# Patient Record
Sex: FEMALE | Race: WHITE | NOT HISPANIC OR LATINO | Employment: UNEMPLOYED | URBAN - METROPOLITAN AREA
[De-identification: names, ages, dates, MRNs, and addresses within clinical notes are randomized per-mention and may not be internally consistent; named-entity substitution may affect disease eponyms.]

---

## 2017-12-06 ENCOUNTER — CONVERSION ENCOUNTER (OUTPATIENT)
Dept: MAMMOGRAPHY | Facility: HOSPITAL | Age: 52
End: 2017-12-06

## 2018-02-05 ENCOUNTER — OFFICE VISIT CONVERTED (OUTPATIENT)
Dept: UROLOGY | Facility: CLINIC | Age: 53
End: 2018-02-05
Attending: UROLOGY

## 2018-02-05 ENCOUNTER — CONVERSION ENCOUNTER (OUTPATIENT)
Dept: SURGERY | Facility: CLINIC | Age: 53
End: 2018-02-05

## 2019-07-03 ENCOUNTER — OFFICE VISIT CONVERTED (OUTPATIENT)
Dept: UROLOGY | Facility: CLINIC | Age: 54
End: 2019-07-03
Attending: UROLOGY

## 2019-07-30 ENCOUNTER — HOSPITAL ENCOUNTER (OUTPATIENT)
Dept: ULTRASOUND IMAGING | Facility: HOSPITAL | Age: 54
Discharge: HOME | End: 2019-07-30
Attending: UROLOGY

## 2019-08-13 ENCOUNTER — OFFICE VISIT CONVERTED (OUTPATIENT)
Dept: UROLOGY | Facility: CLINIC | Age: 54
End: 2019-08-13
Attending: UROLOGY

## 2020-11-04 ENCOUNTER — OFFICE VISIT CONVERTED (OUTPATIENT)
Dept: SURGERY | Facility: CLINIC | Age: 55
End: 2020-11-04
Attending: SURGERY

## 2020-11-11 ENCOUNTER — HOSPITAL ENCOUNTER (OUTPATIENT)
Dept: CT IMAGING | Facility: HOSPITAL | Age: 55
Discharge: HOME | End: 2020-11-11
Attending: SURGERY

## 2021-04-20 ENCOUNTER — OFFICE VISIT CONVERTED (OUTPATIENT)
Dept: UROLOGY | Facility: CLINIC | Age: 56
End: 2021-04-20
Attending: UROLOGY

## 2021-04-20 ENCOUNTER — CONVERSION ENCOUNTER (OUTPATIENT)
Dept: SURGERY | Facility: CLINIC | Age: 56
End: 2021-04-20

## 2021-04-26 ENCOUNTER — OFFICE VISIT CONVERTED (OUTPATIENT)
Dept: SURGERY | Facility: CLINIC | Age: 56
End: 2021-04-26
Attending: SURGERY

## 2021-04-30 ENCOUNTER — HOSPITAL ENCOUNTER (OUTPATIENT)
Dept: ULTRASOUND IMAGING | Facility: HOSPITAL | Age: 56
Discharge: HOME | End: 2021-04-30
Attending: SURGERY

## 2021-05-10 NOTE — H&P
History and Physical      Patient Name: Tia Silva   Patient ID: [id number]   Sex: Female   YOB: 1965    Primary Care Provider: Donovan Harrington MD   Referring Provider: Donovan Harrington MD    Visit Date: November 4, 2020    Provider: Stacy Sanchez MD   Location: [facility]   Location Address: [address] [place] [address]  [id number]   Location Phone: [phone]          Chief Complaint  · Abdominal Pain  · Breast Mass/Lump/Nodule  · I might have a hernia?       History Of Present Illness     Very pleasant lady here with 2 separate issues.... the first is a 2 cm cyst in the right outer breast that she does not want to have drained at this time... the second is a possible hernia recurrence versus seroma in right upper quadrant that was painful and a bulge at old incision.       Past Medical History  Abscess; Back Pain ; Cholecystitis, Chronic; Cholelithiasis; Cystitis; Hemiplegia; Left-sided Nephrolithiasis; Right-sided Nephrolithiasis; Seizures         Past Surgical History  Bladder Biopsy; Bladder Surg.; Cholecystectomy; Cystoscopy and ureteroscopy with laser lithotripsy; Hysterectomy; Incision and drainage of left breast abscess; Kidney Stone Surgery, Unspecified; Lumbar epidural steroid injections; Tubal ligation         Medication List  baclofen 10 mg oral tablet; Dulera 100-5 mcg/actuation inhalation HFA aerosol inhaler; gabapentin 300 mg oral capsule; levothyroxine 50 mcg oral tablet; oxycodone oral; potassium 99 mg oral tablet; Ventolin HFA 90 mcg/actuation inhalation HFA aerosol inhaler         Allergy List  BuSpar; Wellbutrin       Allergies Reconciled  Family Medical History  Kidney Cancer         Social History  Tobacco (Current every day)         Review of Systems  · Constitutional  o Denies  o : fever, chills  · Eyes  o Denies  o : yellowish discoloration of the eyes, eye pain  · HENT  o Denies  o : difficulty swallowing,  "hoarseness  · Cardiovascular  o Denies  o : chest pain on exertion, irregular heart beats  · Respiratory  o Denies  o : shortness of breath, cough  · Gastrointestinal  o * See HPI  · Integument  o Denies  o : rash, Skin Lesion or Lump  · Neurologic  o Denies  o : tingling or numbness, loss of balance  · Musculoskeletal  o Denies  o : joint pain, back pain  · Endocrine  o Denies  o : weight gain, weight loss      Vitals  Date Time BP Position Site L\R Cuff Size HR RR TEMP (F) WT  HT  BMI kg/m2 BSA m2 O2 Sat FR L/min FiO2 HC       11/04/2020 09:39 AM       15  210lbs 0oz 5'  7\" 32.89 2.12             Physical Examination  · Constitutional  o Appearance  o : well developed/well nourished patient in no apparent distress  · Respiratory  o Inspection of Chest  o : equal breaths bilaterally  · Gastrointestinal  o Abdominal Examination  o : soft/nontender, nondistended, no organomegaly appreciated, bulge in ruq           Assessment  · Abdominal Pain, RUQ     789.01/R10.11  · Breast cyst     610.0/N60.09      Plan  · Orders  o CT Abdomen and Pelvis without IV Contrast [facility]; suggest oral prep (allergic--drink Readicat; not allegic but with renal failure--drink Omnipaque) (82960) - 789.01/R10.11, 610.0/N60.09 - 11/11/2020  · Medications  o Medications have been Reconciled  o Transition of Care or Provider Policy  · Instructions  o Follow Up in 1 week after ct for resutls            Electronically Signed by: Stacy Sanchez MD -Author on November 4, 2020 09:57:10 AM  "

## 2021-05-11 NOTE — H&P
History and Physical      Patient Name: Tia Silva   Patient ID: [id number]   Sex: Female   YOB: 1965    Primary Care Provider: Donovan Harrington MD   Referring Provider: Donovan Harrington MD    Visit Date: April 26, 2021    Provider: Stacy Sanchez MD   Location: [facility]   Location Address: [address] [place] [address]  [id number]   Location Phone: [phone]          Chief Complaint  · I might have a hernia?      Seroma       History Of Present Illness     Very pleasant lady who was seen last year for similar complaint... she has a chronic seroma from a RUQ hernia repair after a lap jos by a Dr Oh at [facility].   It has continued to bother her and she would like to have it drained.       Past Medical History  Abscess; Back Pain ; Cholecystitis, Chronic; Cholelithiasis; Cystitis; Hemiplegia; Left-sided Nephrolithiasis; Right-sided Nephrolithiasis; Seizures         Past Surgical History  Bladder Biopsy; Bladder Surg.; Cholecystectomy; Cystoscopy and ureteroscopy with laser lithotripsy; Hysterectomy; Incision and drainage of left breast abscess; Kidney Stone Surgery, Unspecified; Lumbar epidural steroid injections; Tubal ligation         Medication List  baclofen 10 mg oral tablet; Dulera 100-5 mcg/actuation inhalation HFA aerosol inhaler; gabapentin 300 mg oral capsule; levothyroxine 50 mcg oral tablet; oxycodone oral; potassium 99 mg oral tablet; Ventolin HFA 90 mcg/actuation inhalation HFA aerosol inhaler         Allergy List  BuSpar; Wellbutrin       Allergies Reconciled  Family Medical History  Kidney Cancer         Social History  Tobacco (Current every day)         Review of Systems  · Constitutional  o Denies  o : fever, chills  · Eyes  o Denies  o : yellowish discoloration of the eyes, eye pain  · HENT  o Denies  o : difficulty swallowing, hoarseness  · Cardiovascular  o Denies  o : chest pain on exertion, irregular heart  "beats  · Respiratory  o Denies  o : shortness of breath, cough  · Gastrointestinal  o Denies  o : nausea, vomiting, diarrhea, constipation  · Integument  o * See HPI  · Neurologic  o Denies  o : tingling or numbness, loss of balance  · Musculoskeletal  o Denies  o : joint pain, back pain  · Endocrine  o Denies  o : weight gain, weight loss      Vitals  Date Time BP Position Site L\R Cuff Size HR RR TEMP (F) WT  HT  BMI kg/m2 BSA m2 O2 Sat FR L/min FiO2        04/26/2021 11:39 AM       16  218lbs 0oz 5'  7\" 34.14 2.16             Physical Examination  · Constitutional  o Appearance  o : well developed/well nourished patient in no apparent distress  · Respiratory  o Inspection of Chest  o : equal breaths bilaterally  · Gastrointestinal  o Abdominal Examination  o : soft/nontender, nondistended, no organomegaly appreciated, palpable seroma at hernia site  · Post Surgical Incision  o Surgical wound  o : healing well, no erythema          Assessment  · Abdominal wall seroma     998.13/S30.1XXA      Plan  · Orders  o CT guided complicated percut placement of drainage catheter of skin (85386) - 998.13/S30.1XXA - 04/26/2021  o CT guided aspiration of abdomen (42329) - 998.13/S30.1XXA - 04/26/2021  · Medications  o Medications have been Reconciled  o Transition of Care or Provider Policy  · Instructions  o Will send for ct guided aspiration of abdominal wall seroma            Electronically Signed by: Stacy Sanchez MD -Author on April 26, 2021 11:56:24 AM  "

## 2021-05-14 VITALS — BODY MASS INDEX: 32.96 KG/M2 | WEIGHT: 210 LBS | HEIGHT: 67 IN | RESPIRATION RATE: 15 BRPM

## 2021-05-14 VITALS — WEIGHT: 218.5 LBS | HEIGHT: 67 IN | BODY MASS INDEX: 34.29 KG/M2 | RESPIRATION RATE: 16 BRPM

## 2021-05-14 VITALS — BODY MASS INDEX: 34.21 KG/M2 | HEIGHT: 67 IN | WEIGHT: 218 LBS | RESPIRATION RATE: 16 BRPM

## 2021-05-14 NOTE — PROGRESS NOTES
"   Progress Note      Patient Name: Tia Silva   Patient ID: [id number]   Sex: Female   YOB: 1965    Primary Care Provider: Donovan Harrington MD   Referring Provider: Donovan Harrington MD    Visit Date: April 20, 2021    Provider: Corie Pretty MD   Location: [facility]   Location Address: [address] [place] [address]  [id number]   Location Phone: [phone]          Chief Complaint  · Pt is here for urological concerns      History Of Present Illness     55 yo lady with history of nephrolithiasis last seen by Dr. Fam, 2/2018, presents for further evaluation of ureteral stone after presentation to outside ER for worsening severe, acute, stabbing right sided flank pain which radiated to abdomen for a couple of days prior to presentation.  Denies alleviating or exacerbating factors.  Denies associated nausea; denies any fever. CT performed in the ER, 6/22/2019, indicated a right proximal ureteral calculi about 5mm with associated hydronephrosis as well as multiple stones within the right and left kidney largest measuring up to 6 mm; no hydronephrosis of the left kidney.  Her symptoms were controlled and she was discharged home for continued trial of passage.     Since discharge from ER, she states she has passed two small stones and patient denies any further significant pain episodes, denies recent fever or nausea.  Occasional \"twinge\" of right-sided flank pain. Not currently requiring medications for stone symptoms.    Update 8/13/2019: Patient presents for follow-up with renal ultrasound prior.  Patient denies flank pain, hematuria, or dysuria.  States she feels well today from a stone standpoint.    Update 4/20/2021: Patient presents with KUB prior.  Patient states she has passed stone since last visit; no trips to ER or significant stone symptoms.  Overall doing well from a stone standpoint.       Past Medical History  Abscess; Back Pain ; Cholecystitis, Chronic; " "Cholelithiasis; Cystitis; Hemiplegia; Left-sided Nephrolithiasis; Right-sided Nephrolithiasis; Seizures         Past Surgical History  Bladder Biopsy; Bladder Surg.; Cholecystectomy; Cystoscopy and ureteroscopy with laser lithotripsy; Hysterectomy; Incision and drainage of left breast abscess; Kidney Stone Surgery, Unspecified; Lumbar epidural steroid injections; Tubal ligation         Medication List  baclofen 10 mg oral tablet; Dulera 100-5 mcg/actuation inhalation HFA aerosol inhaler; gabapentin 300 mg oral capsule; levothyroxine 50 mcg oral tablet; oxycodone oral; potassium 99 mg oral tablet; Ventolin HFA 90 mcg/actuation inhalation HFA aerosol inhaler         Allergy List  BuSpar; Wellbutrin       Allergies Reconciled  Family Medical History  Kidney Cancer         Social History  Tobacco (Current every day)         Review of Systems  · Constitutional  o Denies  o : fever, chills  · Gastrointestinal  o Denies  o : nausea, vomiting      Vitals  Date Time BP Position Site L\R Cuff Size HR RR TEMP (F) WT  HT  BMI kg/m2 BSA m2 O2 Sat FR L/min FiO2        04/20/2021 02:00 PM       16  218lbs 8oz 5'  7\" 34.22 2.16             Physical Examination  · Constitutional  o Appearance  o : Well nourished, well developed patient in no acute distress.  · Eyes  o Conjunctivae  o : Conjunctivae normal  o Sclerae  o : Sclerae white  · Ears, Nose, Mouth and Throat  o Ears  o :   § Hearing  § : Intact to conversational voice both ears  § Ears  § : Normal  o Nose  o :   § External Nose  § : Appearance normal  · Skin and Subcutaneous Tissue  o General Inspection  o : No rashes, lesions, or areas of discoloration present  o General Palpation  o : Skin Turgor Normal  · Neurologic  o Mental Status Examination  o :   § Orientation  § : Alert and Oriented X3  o Gait and Station  o :   § Gait Screening  § : Ambulating without difficulty  · Psychiatric  o Mood and Affect  o : Mood normal, affect appropriate          Results       Confucianism " [facility]      PACS RADIOLOGY REPORT    Patient: MAUREEN JEFFRIES Acct: #[id number] Report: #[id number]    UNIT #: [id number]  DOS: 20 0942  INSURANCE:[employer] ORDER #:[id number]  LOCATION:CT   : 1965    PROVIDERS  ADMITTING:   ATTENDING: EVERETT SANFORD MD  FAMILY:  FLORESITA CHAPMAN ORDERING:  EVERETT SANFORD MD   OTHER:  DICTATING:  Gustavo Blackburn MD    REQ #:[id number]   EXAM:ABDPELWO - CT ABDOMEN PELVIS wo CONTRAST  REASON FOR EXAM:  RUQ PAIN/BULDGE  REASON FOR VISIT:  RUQ PAIN/BULDGE    *******Signed******     PROCEDURE: CT ABDOMEN PELVIS WITHOUT CONTRAST     COMPARISON: [facility], CT, ABDOMEN/PELVIS WITH CONTRAST, 2013, 22:54.  Other,   CT, ABD PEL W/O CONTRAST, 2019, 4:56.     INDICATIONS: RUQ PAIN/BULDGE     TECHNIQUE: CT images were created without intravenous contrast.       PROTOCOL:   Standard imaging protocol performed      RADIATION:   DLP: 967mGy*cm    Automated exposure control was utilized to minimize radiation dose.      FINDINGS:   No consolidations or pleural effusions are seen involving the lung bases.      The liver, spleen, and pancreas are unremarkable without contrast.  The patient is status post   cholecystectomy.  There are residual postoperative changes involving the anterior abdominal wall   overlying the right upper quadrant related to post cholecystectomy changes.  There is focal fluid   within this region which suggests a chronic seroma.  These findings are stable when compared to the   prior CT from 2019.  The bilateral adrenal glands are symmetric and unremarkable.      Multiple nonobstructive stones are observed involving the kidneys bilaterally measuring up to 6 mm   at the lower pole of the right kidney.  There is no hydronephrosis or hydroureter. There is no   evidence for obstructive uropathy. No stones are seen within the bladder.     No significant bowel dilatation or  obstruction is seen. A normal-appearing air-filled appendix is   observed. There is no evidence for acute appendicitis. No additional abnormal fluid collections are   seen. No significant free fluid is observed. No significant lymphadenopathy is seen throughout the   abdomen or pelvis. The bladder is partially decompressed.      No acute osseous abnormalities are seen.     CONCLUSION:   1. No evidence for acute abnormality throughout the abdomen or pelvis on this noncontrast study.  2. Nonobstructive stones are noted involving the kidneys bilaterally measuring up to 6 mm at the   lower pole of the right kidney. There is no evidence for obstructive uropathy.  3. Stable chronic postoperative changes with scarring and postoperative complex seroma are noted   involving the anterior abdominal wall overlying the right upper quadrant.            YULISA HUERTA MD         Electronically Signed and Approved By: YULISA HUERTA MD on 11/11/2020 at 11:07                     Until signed, this is an unconfirmed preliminary report that may contain  errors and is subject to change.                HAZDA:  D:11/11/20 1107         Assessment  · Nephrolithiasis     592.0/N20.0    Problems Reconciled  Plan  · Orders  o KUB xray [facility] (08504) - 592.0/N20.0 - 04/20/2022  · Medications  o Medications have been Reconciled  o Transition of Care or Provider Policy  · Instructions  o Electronically Identified Patient Education Materials Provided Electronically     KUB and CT scan reviewed and discussed with patient at length, no hydronephrosis, or ureteral calculi, nonobstructing bilateral nephrolithiasis up to 6 mm; patient adapt to passing stones without intervention; no stone of a size that warrants intervention  Continue to monitor.   Continue dietary modifications for stone prevention  All questions addressed    Patient to follow-up in 1 year with KUB prior      MDM low: 1 stable chronic illness; reviewed KUB and CT scan  imaging; ordered KUB             Electronically Signed by: Corie Pretty MD -Author on April 20, 2021 03:07:18 PM

## 2021-05-15 VITALS — HEIGHT: 68 IN | BODY MASS INDEX: 31.52 KG/M2 | WEIGHT: 208 LBS | RESPIRATION RATE: 14 BRPM

## 2021-05-15 VITALS — RESPIRATION RATE: 16 BRPM | HEIGHT: 68 IN | WEIGHT: 208 LBS | BODY MASS INDEX: 31.52 KG/M2

## 2021-05-16 VITALS
WEIGHT: 204 LBS | HEIGHT: 68 IN | DIASTOLIC BLOOD PRESSURE: 70 MMHG | BODY MASS INDEX: 30.92 KG/M2 | SYSTOLIC BLOOD PRESSURE: 108 MMHG

## 2021-06-16 ENCOUNTER — OFFICE VISIT (OUTPATIENT)
Dept: SURGERY | Facility: CLINIC | Age: 56
End: 2021-06-16

## 2021-06-16 VITALS — RESPIRATION RATE: 16 BRPM | BODY MASS INDEX: 33.74 KG/M2 | WEIGHT: 215 LBS | HEIGHT: 67 IN

## 2021-06-16 DIAGNOSIS — Z98.890 STATUS POST HERNIA REPAIR: Primary | ICD-10-CM

## 2021-06-16 DIAGNOSIS — Z87.19 STATUS POST HERNIA REPAIR: Primary | ICD-10-CM

## 2021-06-16 PROBLEM — R56.9 SEIZURES: Status: ACTIVE | Noted: 2021-06-16

## 2021-06-16 PROBLEM — M54.9 BACK PAIN: Status: ACTIVE | Noted: 2021-06-16

## 2021-06-16 PROBLEM — K81.1 CHOLECYSTITIS, CHRONIC: Status: ACTIVE | Noted: 2021-06-16

## 2021-06-16 PROBLEM — K80.20 CHOLELITHIASIS: Status: ACTIVE | Noted: 2021-06-16

## 2021-06-16 PROBLEM — G81.90 HEMIPLEGIA (HCC): Status: ACTIVE | Noted: 2021-06-16

## 2021-06-16 PROCEDURE — 99213 OFFICE O/P EST LOW 20 MIN: CPT | Performed by: SURGERY

## 2021-06-16 RX ORDER — SULFAMETHOXAZOLE AND TRIMETHOPRIM 800; 160 MG/1; MG/1
TABLET ORAL
COMMUNITY
End: 2021-08-17

## 2021-06-16 RX ORDER — IPRATROPIUM BROMIDE AND ALBUTEROL SULFATE 2.5; .5 MG/3ML; MG/3ML
SOLUTION RESPIRATORY (INHALATION)
COMMUNITY
Start: 2021-05-21 | End: 2021-08-17 | Stop reason: ALTCHOICE

## 2021-06-16 RX ORDER — AZITHROMYCIN 250 MG/1
TABLET, FILM COATED ORAL
COMMUNITY
Start: 2021-05-21 | End: 2021-09-21

## 2021-06-16 RX ORDER — NYSTATIN 100000 U/G
CREAM TOPICAL SEE ADMIN INSTRUCTIONS
COMMUNITY
Start: 2021-06-09 | End: 2021-08-17

## 2021-06-16 RX ORDER — LEVOTHYROXINE SODIUM 112 UG/1
TABLET ORAL
COMMUNITY
Start: 2021-05-23 | End: 2021-08-17 | Stop reason: ALTCHOICE

## 2021-06-16 RX ORDER — BROMPHENIRAMINE MALEATE, PSEUDOEPHEDRINE HYDROCHLORIDE, AND DEXTROMETHORPHAN HYDROBROMIDE 2; 30; 10 MG/5ML; MG/5ML; MG/5ML
SYRUP ORAL
COMMUNITY
Start: 2021-05-16 | End: 2021-08-17

## 2021-06-16 RX ORDER — CEFDINIR 300 MG/1
CAPSULE ORAL
COMMUNITY
Start: 2021-05-21 | End: 2021-08-17

## 2021-06-16 RX ORDER — FLUCONAZOLE 200 MG/1
200 TABLET ORAL DAILY
COMMUNITY
Start: 2021-06-09 | End: 2021-08-17

## 2021-06-16 RX ORDER — BACLOFEN 10 MG/1
TABLET ORAL 3 TIMES DAILY PRN
Status: ON HOLD | COMMUNITY
End: 2022-07-23

## 2021-06-16 RX ORDER — GABAPENTIN 300 MG/1
300 CAPSULE ORAL 3 TIMES DAILY
Qty: 90 CAPSULE | Refills: 2 | Status: ON HOLD | OUTPATIENT
Start: 2021-06-16 | End: 2022-07-23

## 2021-06-16 RX ORDER — LEVOTHYROXINE SODIUM 0.05 MG/1
TABLET ORAL
COMMUNITY
End: 2021-08-17 | Stop reason: DRUGHIGH

## 2021-06-16 RX ORDER — MAGNESIUM OXIDE 400 MG/1
400 TABLET ORAL DAILY PRN
Status: ON HOLD | COMMUNITY
Start: 2021-05-23 | End: 2022-07-23

## 2021-06-16 RX ORDER — METHYLPREDNISOLONE 4 MG/1
TABLET ORAL
COMMUNITY
Start: 2021-05-16 | End: 2021-08-17

## 2021-06-16 RX ORDER — FLUCONAZOLE 150 MG/1
TABLET ORAL
COMMUNITY
Start: 2021-06-02 | End: 2021-08-17

## 2021-06-16 RX ORDER — PAROXETINE 30 MG/1
30 TABLET, FILM COATED ORAL EVERY MORNING
COMMUNITY
Start: 2021-05-23 | End: 2022-04-12 | Stop reason: DRUGHIGH

## 2021-06-16 RX ORDER — GABAPENTIN 300 MG/1
CAPSULE ORAL
COMMUNITY
End: 2021-06-16

## 2021-06-16 RX ORDER — ALBUTEROL SULFATE 90 UG/1
AEROSOL, METERED RESPIRATORY (INHALATION)
Status: ON HOLD | COMMUNITY
End: 2022-07-23

## 2021-06-16 RX ORDER — POTASSIUM CHLORIDE 750 MG/1
10 TABLET, FILM COATED, EXTENDED RELEASE ORAL DAILY
Status: ON HOLD | COMMUNITY
Start: 2021-05-23 | End: 2022-07-23

## 2021-06-16 RX ORDER — LAMOTRIGINE 100 MG/1
TABLET ORAL
COMMUNITY
End: 2021-08-17

## 2021-06-16 RX ORDER — LEVOTHYROXINE SODIUM 0.07 MG/1
75 TABLET ORAL DAILY
COMMUNITY
End: 2021-09-21 | Stop reason: DRUGHIGH

## 2021-06-16 NOTE — PATIENT INSTRUCTIONS
The patient has tried Neurontin in the past and did not feel like it helped but that was over 2 years ago and she is willing to try again.  Follow-up in 3 months

## 2021-06-16 NOTE — PROGRESS NOTES
Chief Complaint: Follow-up    Subjective         History of Present Illness  Tia Silva is a 56 y.o. female presents to [facility] to be seen for pain following hernia repair done by Dr. Cruz in [place].  She has had pain since the surgical date.  She has seen multiple people for this with no resolution.  On imaging there is no sign of hernia recurrence and on physical exam there is no sign of abscess or draining area and this was done 4 years ago.  Her most recent CAT scan results are listed below      COMPARISON: [facility], CT, ABDOMEN/PELVIS WITH CONTRAST, 12/12/2013, 22:54.  Other,     CT, ABD PEL W/O CONTRAST, 6/22/2019, 4:56.         INDICATIONS: RUQ PAIN/BULDGE         TECHNIQUE: CT images were created without intravenous contrast.           PROTOCOL:   Standard imaging protocol performed          RADIATION:   DLP: 967mGy*cm      Automated exposure control was utilized to minimize radiation dose.          FINDINGS:     No consolidations or pleural effusions are seen involving the lung bases.          The liver, spleen, and pancreas are unremarkable without contrast.  The patient is status post     cholecystectomy.  There are residual postoperative changes involving the anterior abdominal wall     overlying the right upper quadrant related to post cholecystectomy changes.  There is focal fluid     within this region which suggests a chronic seroma.  These findings are stable when compared to the     prior CT from 6/22/2019.  The bilateral adrenal glands are symmetric and unremarkable.          Multiple nonobstructive stones are observed involving the kidneys bilaterally measuring up to 6 mm     at the lower pole of the right kidney.  There is no hydronephrosis or hydroureter. There is no     evidence for obstructive uropathy. No stones are seen within the bladder.         No significant bowel dilatation or obstruction is seen. A normal-appearing  air-filled appendix is     observed. There is no evidence for acute appendicitis. No additional abnormal fluid collections are     seen. No significant free fluid is observed. No significant lymphadenopathy is seen throughout the     abdomen or pelvis. The bladder is partially decompressed.          No acute osseous abnormalities are seen.         CONCLUSION:     1. No evidence for acute abnormality throughout the abdomen or pelvis on this noncontrast study.    2. Nonobstructive stones are noted involving the kidneys bilaterally measuring up to 6 mm at the     lower pole of the right kidney. There is no evidence for obstructive uropathy.    3. Stable chronic postoperative changes with scarring and postoperative complex seroma are noted     involving the anterior abdominal wall overlying the right upper quadrant.         We attempted to have this area aspirated or drained and they were unable to due to the complexity of the seroma.  Objective     Past Medical History:   Diagnosis Date   • Back pain    • Breast abscess 06/17/2014   • Cholecystitis, chronic    • Cholelithiasis    • Cystitis 08/03/2016   • Hemiplegia (CMS/HCC)    • Left nephrolithiasis 05/12/2016   • Right nephrolithiasis 05/12/2016       Past Surgical History:   Procedure Laterality Date   • BLADDER SURGERY     • CHOLECYSTECTOMY     • CYSTOSCOPY BLADDER BIOPSY     • CYSTOSCOPY URETEROSCOPY LASER LITHOTRIPSY     • HYSTERECTOMY     • INCISION AND DRAINAGE BREAST ABSCESS Left    • KIDNEY STONE SURGERY     • LUMBAR EPIDURAL INJECTION      Steriod injections   • TUBAL ABDOMINAL LIGATION           Current Outpatient Medications:   •  albuterol sulfate HFA (Ventolin HFA) 108 (90 Base) MCG/ACT inhaler, Ventolin HFA 90 mcg/actuation inhalation HFA aerosol inhaler inhale 1 puff (90 mcg) by inhalation route every 6 hours as needed   Active, Disp: , Rfl:   •  azithromycin (ZITHROMAX) 250 MG tablet, , Disp: , Rfl:   •  baclofen (LIORESAL) 10 MG tablet, baclofen 10  mg oral tablet take 1 tablet (10 mg) by oral route 3 times per day   Active, Disp: , Rfl:   •  brompheniramine-pseudoephedrine-DM 30-2-10 MG/5ML syrup, , Disp: , Rfl:   •  cefdinir (OMNICEF) 300 MG capsule, , Disp: , Rfl:   •  Euthyrox 112 MCG tablet, , Disp: , Rfl:   •  fluconazole (DIFLUCAN) 150 MG tablet, , Disp: , Rfl:   •  fluconazole (DIFLUCAN) 200 MG tablet, Take 200 mg by mouth Daily., Disp: , Rfl:   •  ipratropium-albuterol (DUO-NEB) 0.5-2.5 mg/3 ml nebulizer, , Disp: , Rfl:   •  lamoTRIgine (LaMICtal) 100 MG tablet, , Disp: , Rfl:   •  levothyroxine (SYNTHROID, LEVOTHROID) 50 MCG tablet, levothyroxine 50 mcg oral tablet take 1 tablet (50 mcg) by oral route once daily   Active, Disp: , Rfl:   •  levothyroxine (SYNTHROID, LEVOTHROID) 75 MCG tablet, , Disp: , Rfl:   •  magnesium oxide (MAG-OX) 400 MG tablet, , Disp: , Rfl:   •  methylPREDNISolone (MEDROL) 4 MG dose pack, , Disp: , Rfl:   •  mometasone-formoterol (Dulera) 100-5 MCG/ACT inhaler, Dulera 100-5 mcg/actuation inhalation HFA aerosol inhaler inhale 2 puffs by inhalation route 2 times per day in the morning and evening   Active, Disp: , Rfl:   •  nystatin (MYCOSTATIN) 436206 UNIT/GM cream, Apply  topically to the appropriate area as directed See Admin Instructions. Apply topically to the affected area twice daily, Disp: , Rfl:   •  OXYCODONE ER PO, oxycodone oral 7.5mg 1 po tid prn   Active, Disp: , Rfl:   •  PARoxetine (PAXIL) 30 MG tablet, , Disp: , Rfl:   •  potassium chloride 10 MEQ CR tablet, , Disp: , Rfl:   •  POTASSIUM PO, , Disp: , Rfl:   •  sulfamethoxazole-trimethoprim (Bactrim DS) 800-160 MG per tablet, Bactrim -160 mg oral tablet take 1 tablet by oral route 2 times per day for 7 days   Suspended, Disp: , Rfl:   •  gabapentin (Neurontin) 300 MG capsule, Take 1 capsule by mouth 3 (Three) Times a Day., Disp: 90 capsule, Rfl: 2    Allergies   Allergen Reactions   • Bupropion Unknown - Low Severity   • Buspirone Unknown - Low Severity     "    Family History   Problem Relation Age of Onset   • Kidney cancer Brother        Social History     Socioeconomic History   • Marital status:      Spouse name: Not on file   • Number of children: Not on file   • Years of education: Not on file   • Highest education level: Not on file   Tobacco Use   • Smoking status: Current Every Day Smoker   • Tobacco comment: 1 ppd       Vital Signs:   Resp 16   Ht 170.2 cm (67\")   Wt 97.5 kg (215 lb)   BMI 33.67 kg/m²    Review of Systems    Physical Exam     Result Review :            Assessment and Plan    Diagnoses and all orders for this visit:    1. Status post hernia repair (Primary)  -     gabapentin (Neurontin) 300 MG capsule; Take 1 capsule by mouth 3 (Three) Times a Day.  Dispense: 90 capsule; Refill: 2        Follow Up   Return in about 3 months (around 9/16/2021).  Patient was given instructions and counseling regarding her condition or for health maintenance advice. Please see specific information pulled into the AVS if appropriate.          "

## 2021-08-17 ENCOUNTER — OFFICE VISIT (OUTPATIENT)
Dept: SURGERY | Facility: CLINIC | Age: 56
End: 2021-08-17

## 2021-08-17 VITALS — BODY MASS INDEX: 33.13 KG/M2 | WEIGHT: 218.6 LBS | HEIGHT: 68 IN

## 2021-08-17 DIAGNOSIS — R19.01 ABDOMINAL WALL MASS OF RIGHT UPPER QUADRANT: Primary | ICD-10-CM

## 2021-08-17 PROCEDURE — 99213 OFFICE O/P EST LOW 20 MIN: CPT | Performed by: SURGERY

## 2021-08-17 RX ORDER — PREDNISONE 20 MG/1
TABLET ORAL
COMMUNITY
Start: 2021-08-11 | End: 2021-09-21

## 2021-08-17 RX ORDER — ERYTHROMYCIN 500 MG/1
500 TABLET, DELAYED RELEASE ORAL 2 TIMES DAILY
COMMUNITY
End: 2021-09-21

## 2021-08-17 NOTE — PROGRESS NOTES
General Surgery  Initial Office Visit    CC: Abdominal wall mass, abdominal pain    HPI: The patient is a pleasant 56 y.o. year-old lady who presents today for discussion of a complex abdominal wall mass that was found almost a year ago within the right upper quadrant.  She has had daily intermittent right upper quadrant abdominal pain at the site of previous hernia repair for the last 4 years.  The hernia repair was done in 2016 by Dr. Oh in [place], [place] after she developed a hernia through what appears to be the epigastric incision from a previous laparoscopic cholecystectomy.  She then underwent open ventral hernia repair with mesh and says the pain started almost immediately thereafter.  She says the pain comes on spontaneously with no clear inciting factors.  It will occur daily and last for anywhere from a minute upwards to about 30 minutes before subsiding.  She says at times it is so severe it doubles her over and she is unable to move.  She has no associated nausea, vomiting, or change in her bowel habits.  She has not noticed a bulge in the site of her pain along the upper abdominal wall.  She has had work-up for this involving a CT abdomen/pelvis that was done in November 2020 demonstrating a complex seroma versus scar at the site of her hernia repair.  There was no sign of recurrent hernia on imaging.  She then saw a general surgeon at [facility], Stacy Sanchez MD, who reviewed the imaging done at [facility] and recommended ultrasound-guided aspiration.  When she presented for the ultrasound-guided aspiration, it was noted that the mass had become more solid and was no longer fluid-filled.  The aspiration was therefore unsuccessful.  She was then referred to see me for a second opinion as she wishes to have surgery to remove it and Dr. Sanchez did not recommend surgical removal.    Past Medical History:   Hypothyroidism  COPD  Asthma  History of nephrolithiasis  Back  problems  Frequent bladder infections  History of bronchitis  History of seizures  History of stroke    Past Surgical History:   Laparoscopic cholecystectomy  Hysterectomy  Lithotripsy  Tubal ligation  Cystoscopy with bladder biopsy  Incision and drainage of left breast abscess  Left breast excisional biopsy x2  Ventral hernia repair with mesh (?2016 in [place], [place])  Colonoscopy (2015 in [place], [place]-no problems reported)    Medications:   Albuterol inhaler every 6 hours as needed  Azithromycin Z-Kody as needed for pneumonia/bronchitis  Baclofen 10 mg 3 times daily  Cholecalciferol 5000 units daily  Erythromycin 500 mg twice daily  Fluticasone-Umeclidin-Vilant inhaler 1 puff daily  Gabapentin 300 mg 3 times daily  Levothyroxine 75 mcg daily  Magnesium oxide 400 mg daily  Dulera inhaler 2 puffs twice daily  Paroxetine 30 mg daily  Potassium chloride 10 mEq daily  Oxycodone 7.5 mg 3 times daily as needed for pain    Allergies: Buspirone/bupropion    Family History: Father with history of colon/lung cancer, brother with kidney cancer    Social History: , smokes 1 pack/day cigarettes for the last 25 years, no regular alcohol use, no illicit drug use    ROS:   Constitutional: Positive for activity change and fatigue; negative for fevers or chills  HENT: Negative for hearing loss or runny nose  Eyes: Negative for vision changes or scleral icterus  Respiratory: Negative for cough or shortness of breath  Cardiovascular: Negative for chest pain or heart palpitations  Gastrointestinal: Positive for abdominal pain; negative for abdominal distension, nausea, vomiting, constipation, melena, or hematochezia  Genitourinary: Negative for hematuria or dysuria  Musculoskeletal: Positive for back pain, neck pain, and neck stiffness; negative for joint swelling or gait instability  Neurologic: Negative for tremors or seizures  Psychiatric: Negative for suicidal ideations or agitation  All other systems reviewed and  negative    Physical Exam:  Height: 170 cm  Weight: 99.2 kg  BMI: 33.2  General: No acute distress, well-nourished & well-developed  HEAD: normocephalic, atraumatic  EYES: normal conjunctiva, sclera anicteric  EARS: grossly normal hearing  NECK: supple, no thyromegaly  CARDIOVASCULAR: regular rate and rhythm  RESPIRATORY: clear to auscultation bilaterally  GASTROINTESTINAL: soft, nontender, non-distended, well-healed scars from previous laparoscopic cholecystectomy and open ventral hernia repair within the epigastric abdominal wall, no palpable recurrent hernia, no overlying skin erythema or fluctuance, subtle soft tissue nodularity within the area of concern feels like abnormal scar  MUSCULOSKELETAL: normal gait and station. No gross extremity abnormalities  PSYCHIATRIC: oriented x3, normal mood and affect    IMAGING:  CT ABD/PELVIS (Nov 2020):  CONCLUSION:     1. No evidence for acute abnormality throughout the abdomen or pelvis on this noncontrast study.    2. Nonobstructive stones are noted involving the kidneys bilaterally measuring up to 6 mm at the lower pole of the right kidney. There is no evidence for obstructive uropathy.    3. Stable chronic postoperative changes with scarring and postoperative complex seroma are noted involving the anterior abdominal wall overlying the right upper quadrant.      ASSESSMENT & PLAN  Mrs. Silva is a 56-year-old lady with a reported complex seroma versus complex scar of the subcutaneous fat overlying a previous ventral hernia repair done in [place], [place].  I reviewed the CT report from November of last year done at [facility], but I do not have access to the images so I cannot really discuss any potential surgical management of this without seeing this complex mass for myself.  I have recommended we repeat a CT abdomen/pelvis either here at [facility] or at an outside facility as long as she is able to bring the disc to me for my personal review.  At this time,  I see nothing that would warrant urgent surgery, in particular no sign of infection or recurrent hernia.  I doubt doubt that she is having some pain at that location, but I did caution her that surgical removal of any sort of complex seroma or surgical scar within the subcutaneous fat could lead to a weakening of the musculature and lead to a recurrent hernia.  It also may not alleviate any of her symptoms at all.  I would like for her to come back and see me in the office after she has undergone repeat CT and we can review the imaging together and discuss if any surgical intervention might be helpful.    Millie Hummel MD  General, Robotic, and Endoscopic Surgery  [facility]    [address], [address]  [place] [address]  P: [phone]  F: [phone]

## 2021-08-26 ENCOUNTER — HOSPITAL ENCOUNTER (OUTPATIENT)
Dept: CT IMAGING | Facility: HOSPITAL | Age: 56
Discharge: HOME | End: 2021-08-26
Admitting: SURGERY

## 2021-08-26 DIAGNOSIS — R19.01 ABDOMINAL WALL MASS OF RIGHT UPPER QUADRANT: ICD-10-CM

## 2021-08-26 PROCEDURE — 74177 CT ABD & PELVIS W/CONTRAST: CPT

## 2021-08-26 PROCEDURE — 74177 CT ABD & PELVIS W/CONTRAST: CPT | Performed by: RADIOLOGY

## 2021-08-26 PROCEDURE — 0 IOPAMIDOL PER 1 ML: Performed by: SURGERY

## 2021-08-26 RX ADMIN — IOPAMIDOL 100 ML: 755 INJECTION, SOLUTION INTRAVENOUS at 11:54

## 2021-09-21 ENCOUNTER — OFFICE VISIT (OUTPATIENT)
Dept: SURGERY | Facility: CLINIC | Age: 56
End: 2021-09-21

## 2021-09-21 VITALS — BODY MASS INDEX: 33.65 KG/M2 | HEIGHT: 68 IN | WEIGHT: 222 LBS

## 2021-09-21 DIAGNOSIS — Z98.890 STATUS POST HERNIA REPAIR: ICD-10-CM

## 2021-09-21 DIAGNOSIS — Z87.19 STATUS POST HERNIA REPAIR: ICD-10-CM

## 2021-09-21 DIAGNOSIS — R19.01 ABDOMINAL WALL MASS OF RIGHT UPPER QUADRANT: Primary | ICD-10-CM

## 2021-09-21 PROCEDURE — 99214 OFFICE O/P EST MOD 30 MIN: CPT | Performed by: SURGERY

## 2021-09-21 RX ORDER — LEVOTHYROXINE SODIUM 112 UG/1
112 TABLET ORAL DAILY
COMMUNITY
Start: 2021-08-22 | End: 2021-10-15

## 2021-09-21 RX ORDER — CEFAZOLIN SODIUM 2 G/100ML
2 INJECTION, SOLUTION INTRAVENOUS ONCE
Status: CANCELLED | OUTPATIENT
Start: 2021-10-19 | End: 2021-09-21

## 2021-09-24 NOTE — PROGRESS NOTES
General Surgery  Established Patient Office Visit    CC: Follow-up abdominal wall mass, abdominal pain    HPI: The patient is a pleasant 56 y.o. year-old lady who returns to see me today to discuss her recent CT abdomen/pelvis that was obtained for work-up of a complex abdominal wall mass that was found almost a year ago within the epigastrium/right upper quadrant.  She has had daily intermittent right upper quadrant abdominal pain at the site of previous hernia repair for the last 4 years.  The hernia repair was done in 2016 by Dr. Oh in [place], [place] after she developed a hernia through what appears to be the epigastric incision from a previous laparoscopic cholecystectomy.  She underwent open ventral hernia repair with mesh (the operative report states it was a circular polypropylene mesh with tails similar to Ventralex) and says the pain started almost immediately thereafter.  She says the pain comes on spontaneously with no clear inciting factors.  It will occur daily and last for anywhere from a minute upwards to about 30 minutes before subsiding.  She says at times it is so severe it doubles her over and she is unable to move.  She has no associated nausea, vomiting, or change in her bowel habits.  She has not noticed a bulge at the site of her pain along the upper abdominal wall.  She has had work-up for this involving a CT abdomen/pelvis that was done in November 2020 demonstrating a complex seroma versus scar at the site of her hernia repair.  There was no sign of recurrent hernia on imaging.  She then saw a general surgeon at [facility], Stacy Sanchez MD, who reviewed the imaging done at [facility] and recommended ultrasound-guided aspiration.  When she presented for the ultrasound-guided aspiration, it was noted that the mass had become more solid and was no longer fluid-filled.  The aspiration was therefore unsuccessful.  She was then referred to see me for a second opinion as she  wishes to have surgery to remove it and Dr. Sanchez did not recommend surgical removal.  After I saw her I recommended checking a new CT scan that I can personally review, and it does indeed show mesh clamped within her abdominal wall muscles anterior to the liver within the epigastrium/right upper quadrant.    Past Medical History:   Hypothyroidism  COPD  Asthma  History of nephrolithiasis  Back problems  Frequent bladder infections  History of bronchitis  History of seizures  History of stroke    Past Surgical History:   Laparoscopic cholecystectomy  Hysterectomy  Lithotripsy  Tubal ligation  Cystoscopy with bladder biopsy  Incision and drainage of left breast abscess  Left breast excisional biopsy x2  Ventral hernia repair with mesh (?2016 in [place], [place])  Colonoscopy (2015 in [place], [place]-no problems reported)    Medications:   Albuterol inhaler every 6 hours as needed  Azithromycin Z-Kody as needed for pneumonia/bronchitis  Baclofen 10 mg 3 times daily  Cholecalciferol 5000 units daily  Erythromycin 500 mg twice daily  Fluticasone-Umeclidin-Vilant inhaler 1 puff daily  Gabapentin 300 mg 3 times daily  Levothyroxine 75 mcg daily  Magnesium oxide 400 mg daily  Dulera inhaler 2 puffs twice daily  Paroxetine 30 mg daily  Potassium chloride 10 mEq daily  Oxycodone 7.5 mg 3 times daily as needed for pain    Allergies: Buspirone/bupropion    Family History: Father with history of colon/lung cancer, brother with kidney cancer    Social History: , smokes 1 pack/day cigarettes for the last 25 years, no regular alcohol use, no illicit drug use    ROS:   Constitutional: Positive for activity change and fatigue; negative for fevers or chills  HENT: Negative for hearing loss or runny nose  Eyes: Negative for vision changes or scleral icterus  Respiratory: Negative for cough or shortness of breath  Cardiovascular: Negative for chest pain or heart palpitations  Gastrointestinal: Positive for abdominal pain;  negative for abdominal distension, nausea, vomiting, constipation, melena, or hematochezia  Genitourinary: Negative for hematuria or dysuria  Musculoskeletal: Positive for back pain, neck pain, and neck stiffness; negative for joint swelling or gait instability  Neurologic: Negative for tremors or seizures  Psychiatric: Negative for suicidal ideations or agitation  All other systems reviewed and negative    Physical Exam:  Height: 170 cm  Weight: 99.2 kg  BMI: 33.2  General: No acute distress, well-nourished & well-developed  HEAD: normocephalic, atraumatic  EYES: normal conjunctiva, sclera anicteric  EARS: grossly normal hearing  NECK: supple, no thyromegaly  CARDIOVASCULAR: regular rate and rhythm  RESPIRATORY: clear to auscultation bilaterally  GASTROINTESTINAL: soft, nontender, non-distended, well-healed scars from previous laparoscopic cholecystectomy and open ventral hernia repair within the epigastric abdominal wall, no palpable recurrent hernia, no overlying skin erythema or fluctuance, subtle soft tissue nodularity within the area of concern feels like abnormal scar  MUSCULOSKELETAL: normal gait and station. No gross extremity abnormalities  PSYCHIATRIC: oriented x3, normal mood and affect    IMAGING:  CT ABD/PELVIS (Aug 2021):  CONCLUSION:   CT scan of the abdomen and pelvis with oral and IV contrast demonstrating fatty liver.  Post cholecystectomy and hysterectomy.  Tiny nonobstructing stones are seen in both kidneys.  Chronic postoperative change and scarring in the anterior abdominal wall in the right upper quadrant is stable.    ASSESSMENT & PLAN  Mrs. Silva is a 56-year-old lady with significant abdominal pain at the site of previous ventral hernia repair with polypropylene mesh.  I reviewed the recent CT abdomen/pelvis that was done and showed her the mesh stuck within her abdominal wall muscles and clogged up into a fairly large phlegmon of inflammation.  Given the severity of her pain, I would  recommend we proceed with surgical removal of this mesh and then perform a ventral hernia repair, as there will be a moderate sized fascial defect after the mesh is removed.  Performing the ventral hernia repair will be somewhat difficult as the muscles are likely weakened from previous instrumentation/surgery so I would prefer to place a piece of absorbable mesh such as BioA that would help to reinforce the weakened muscles without all of the negative long-term effects of permanent mesh.  I did discuss with her that surgery is not guaranteed to provide her relief, and could potentially make her feel even worse.  However, there is the possibility that removal of the polypropylene mesh could help to alleviate much of her abdominal pain and give her a significant improvement in her quality of life.  After discussing the risks and benefits, she would like to go ahead and proceed with surgery and I am happy to get it scheduled for anytime in early to mid October.    Millie Hummel MD  General, Robotic, and Endoscopic Surgery  [facility]    [address], [address]  [place] [address]  P: [phone]  F: [phone]

## 2021-10-12 DIAGNOSIS — Z01.818 PREOP EXAMINATION: ICD-10-CM

## 2021-10-12 DIAGNOSIS — Z87.19 STATUS POST HERNIA REPAIR: ICD-10-CM

## 2021-10-12 DIAGNOSIS — R19.01 ABDOMINAL WALL MASS OF RIGHT UPPER QUADRANT: Primary | ICD-10-CM

## 2021-10-12 DIAGNOSIS — Z98.890 STATUS POST HERNIA REPAIR: ICD-10-CM

## 2021-10-15 ENCOUNTER — PRE-ADMISSION TESTING (OUTPATIENT)
Dept: PREADMISSION TESTING | Facility: HOSPITAL | Age: 56
End: 2021-10-15

## 2021-10-15 VITALS
HEIGHT: 67 IN | TEMPERATURE: 98.3 F | WEIGHT: 221 LBS | HEART RATE: 79 BPM | SYSTOLIC BLOOD PRESSURE: 156 MMHG | BODY MASS INDEX: 34.69 KG/M2 | OXYGEN SATURATION: 98 % | DIASTOLIC BLOOD PRESSURE: 81 MMHG | RESPIRATION RATE: 16 BRPM

## 2021-10-15 DIAGNOSIS — R19.01 ABDOMINAL WALL MASS OF RIGHT UPPER QUADRANT: ICD-10-CM

## 2021-10-15 DIAGNOSIS — Z98.890 STATUS POST HERNIA REPAIR: ICD-10-CM

## 2021-10-15 DIAGNOSIS — Z87.19 STATUS POST HERNIA REPAIR: ICD-10-CM

## 2021-10-15 LAB
ANION GAP SERPL CALCULATED.3IONS-SCNC: 9.8 MMOL/L (ref 5–15)
BUN SERPL-MCNC: 16 MG/DL (ref 6–20)
BUN/CREAT SERPL: 25.8 (ref 7–25)
CALCIUM SPEC-SCNC: 9.1 MG/DL (ref 8.6–10.5)
CHLORIDE SERPL-SCNC: 103 MMOL/L (ref 98–107)
CO2 SERPL-SCNC: 28.2 MMOL/L (ref 22–29)
CREAT SERPL-MCNC: 0.62 MG/DL (ref 0.57–1)
DEPRECATED RDW RBC AUTO: 41.5 FL (ref 37–54)
ERYTHROCYTE [DISTWIDTH] IN BLOOD BY AUTOMATED COUNT: 13.3 % (ref 12.3–15.4)
GFR SERPL CREATININE-BSD FRML MDRD: 99 ML/MIN/1.73
GLUCOSE SERPL-MCNC: 102 MG/DL (ref 65–99)
HCT VFR BLD AUTO: 40.3 % (ref 34–46.6)
HGB BLD-MCNC: 13.7 G/DL (ref 12–15.9)
MCH RBC QN AUTO: 29.6 PG (ref 26.6–33)
MCHC RBC AUTO-ENTMCNC: 34 G/DL (ref 31.5–35.7)
MCV RBC AUTO: 87 FL (ref 79–97)
PLATELET # BLD AUTO: 253 10*3/MM3 (ref 140–450)
PMV BLD AUTO: 10.5 FL (ref 6–12)
POTASSIUM SERPL-SCNC: 4 MMOL/L (ref 3.5–5.2)
QT INTERVAL: 409 MS
RBC # BLD AUTO: 4.63 10*6/MM3 (ref 3.77–5.28)
SODIUM SERPL-SCNC: 141 MMOL/L (ref 136–145)
WBC # BLD AUTO: 6.74 10*3/MM3 (ref 3.4–10.8)

## 2021-10-15 PROCEDURE — 36415 COLL VENOUS BLD VENIPUNCTURE: CPT

## 2021-10-15 PROCEDURE — 85027 COMPLETE CBC AUTOMATED: CPT

## 2021-10-15 PROCEDURE — 93010 ELECTROCARDIOGRAM REPORT: CPT | Performed by: INTERNAL MEDICINE

## 2021-10-15 PROCEDURE — 80048 BASIC METABOLIC PNL TOTAL CA: CPT

## 2021-10-15 PROCEDURE — 93005 ELECTROCARDIOGRAM TRACING: CPT

## 2021-10-15 RX ORDER — CHLORHEXIDINE GLUCONATE 500 MG/1
CLOTH TOPICAL TAKE AS DIRECTED
Status: ON HOLD | COMMUNITY
End: 2021-10-19

## 2021-10-15 RX ORDER — LEVOTHYROXINE SODIUM 112 UG/1
112 TABLET ORAL DAILY
Status: ON HOLD | COMMUNITY
End: 2022-07-23

## 2021-10-15 NOTE — DISCHARGE INSTRUCTIONS

## 2021-10-16 ENCOUNTER — LAB (OUTPATIENT)
Dept: LAB | Facility: HOSPITAL | Age: 56
End: 2021-10-16

## 2021-10-16 ENCOUNTER — APPOINTMENT (OUTPATIENT)
Dept: LAB | Facility: HOSPITAL | Age: 56
End: 2021-10-16

## 2021-10-16 ENCOUNTER — TRANSCRIBE ORDERS (OUTPATIENT)
Dept: LAB | Facility: HOSPITAL | Age: 56
End: 2021-10-16

## 2021-10-16 DIAGNOSIS — Z01.818 PRE-OP TESTING: Primary | ICD-10-CM

## 2021-10-16 DIAGNOSIS — Z01.818 PRE-OP TESTING: ICD-10-CM

## 2021-10-16 DIAGNOSIS — Z98.890 STATUS POST HERNIA REPAIR: ICD-10-CM

## 2021-10-16 DIAGNOSIS — Z01.818 PREOP EXAMINATION: ICD-10-CM

## 2021-10-16 DIAGNOSIS — R19.01 ABDOMINAL WALL MASS OF RIGHT UPPER QUADRANT: ICD-10-CM

## 2021-10-16 DIAGNOSIS — Z87.19 STATUS POST HERNIA REPAIR: ICD-10-CM

## 2021-10-16 PROCEDURE — U0004 COV-19 TEST NON-CDC HGH THRU: HCPCS

## 2021-10-16 PROCEDURE — C9803 HOPD COVID-19 SPEC COLLECT: HCPCS

## 2021-10-17 LAB — SARS-COV-2 RNA NOSE QL NAA+PROBE: NOT DETECTED

## 2021-10-19 ENCOUNTER — ANESTHESIA (OUTPATIENT)
Dept: PERIOP | Facility: HOSPITAL | Age: 56
End: 2021-10-19

## 2021-10-19 ENCOUNTER — ANESTHESIA EVENT (OUTPATIENT)
Dept: PERIOP | Facility: HOSPITAL | Age: 56
End: 2021-10-19

## 2021-10-19 ENCOUNTER — HOSPITAL ENCOUNTER (OUTPATIENT)
Facility: HOSPITAL | Age: 56
Setting detail: OBSERVATION
Discharge: HOME | End: 2021-10-20
Attending: SURGERY | Admitting: SURGERY

## 2021-10-19 DIAGNOSIS — Z87.19 STATUS POST HERNIA REPAIR: ICD-10-CM

## 2021-10-19 DIAGNOSIS — R19.01 ABDOMINAL WALL MASS OF RIGHT UPPER QUADRANT: Primary | ICD-10-CM

## 2021-10-19 DIAGNOSIS — Z98.890 STATUS POST HERNIA REPAIR: ICD-10-CM

## 2021-10-19 PROCEDURE — 25010000002 HYDROMORPHONE PER 4 MG: Performed by: NURSE ANESTHETIST, CERTIFIED REGISTERED

## 2021-10-19 PROCEDURE — 25010000002 NEOSTIGMINE 5 MG/10ML SOLUTION: Performed by: NURSE ANESTHETIST, CERTIFIED REGISTERED

## 2021-10-19 PROCEDURE — 49560 PR REPAIR INCISIONAL HERNIA,REDUCIBLE: CPT | Performed by: SPECIALIST/TECHNOLOGIST, OTHER

## 2021-10-19 PROCEDURE — 49560 PR REPAIR INCISIONAL HERNIA,REDUCIBLE: CPT | Performed by: SURGERY

## 2021-10-19 PROCEDURE — 94640 AIRWAY INHALATION TREATMENT: CPT

## 2021-10-19 PROCEDURE — 25010000003 CEFAZOLIN IN DEXTROSE 2-4 GM/100ML-% SOLUTION: Performed by: SURGERY

## 2021-10-19 PROCEDURE — 25010000002 FENTANYL CITRATE (PF) 50 MCG/ML SOLUTION: Performed by: NURSE ANESTHETIST, CERTIFIED REGISTERED

## 2021-10-19 PROCEDURE — G0378 HOSPITAL OBSERVATION PER HR: HCPCS

## 2021-10-19 PROCEDURE — 88304 TISSUE EXAM BY PATHOLOGIST: CPT | Performed by: SURGERY

## 2021-10-19 PROCEDURE — 25010000002 DEXAMETHASONE PER 1 MG: Performed by: NURSE ANESTHETIST, CERTIFIED REGISTERED

## 2021-10-19 PROCEDURE — 25010000002 ONDANSETRON PER 1 MG: Performed by: NURSE ANESTHETIST, CERTIFIED REGISTERED

## 2021-10-19 PROCEDURE — 94799 UNLISTED PULMONARY SVC/PX: CPT

## 2021-10-19 PROCEDURE — 25010000002 HYDROMORPHONE PER 4 MG: Performed by: SURGERY

## 2021-10-19 PROCEDURE — 25010000002 PROPOFOL 10 MG/ML EMULSION: Performed by: NURSE ANESTHETIST, CERTIFIED REGISTERED

## 2021-10-19 RX ORDER — BUDESONIDE AND FORMOTEROL FUMARATE DIHYDRATE 160; 4.5 UG/1; UG/1
1 AEROSOL RESPIRATORY (INHALATION)
Status: DISCONTINUED | OUTPATIENT
Start: 2021-10-20 | End: 2021-10-20 | Stop reason: HOSPADM

## 2021-10-19 RX ORDER — LEVOTHYROXINE SODIUM 112 UG/1
112 TABLET ORAL
Status: DISCONTINUED | OUTPATIENT
Start: 2021-10-20 | End: 2021-10-20 | Stop reason: HOSPADM

## 2021-10-19 RX ORDER — LABETALOL HYDROCHLORIDE 5 MG/ML
5 INJECTION, SOLUTION INTRAVENOUS
Status: DISCONTINUED | OUTPATIENT
Start: 2021-10-19 | End: 2021-10-19 | Stop reason: HOSPADM

## 2021-10-19 RX ORDER — PROMETHAZINE HYDROCHLORIDE 25 MG/1
25 SUPPOSITORY RECTAL ONCE AS NEEDED
Status: DISCONTINUED | OUTPATIENT
Start: 2021-10-19 | End: 2021-10-19 | Stop reason: HOSPADM

## 2021-10-19 RX ORDER — ONDANSETRON 2 MG/ML
4 INJECTION INTRAMUSCULAR; INTRAVENOUS EVERY 6 HOURS PRN
Status: DISCONTINUED | OUTPATIENT
Start: 2021-10-19 | End: 2021-10-20 | Stop reason: HOSPADM

## 2021-10-19 RX ORDER — PAROXETINE 30 MG/1
30 TABLET, FILM COATED ORAL EVERY MORNING
Status: DISCONTINUED | OUTPATIENT
Start: 2021-10-20 | End: 2021-10-20 | Stop reason: HOSPADM

## 2021-10-19 RX ORDER — NALOXONE HCL 0.4 MG/ML
0.4 VIAL (ML) INJECTION
Status: DISCONTINUED | OUTPATIENT
Start: 2021-10-19 | End: 2021-10-20 | Stop reason: HOSPADM

## 2021-10-19 RX ORDER — CEFAZOLIN SODIUM 2 G/100ML
2 INJECTION, SOLUTION INTRAVENOUS ONCE
Status: COMPLETED | OUTPATIENT
Start: 2021-10-19 | End: 2021-10-19

## 2021-10-19 RX ORDER — SODIUM CHLORIDE 0.9 % (FLUSH) 0.9 %
3 SYRINGE (ML) INJECTION EVERY 12 HOURS SCHEDULED
Status: DISCONTINUED | OUTPATIENT
Start: 2021-10-19 | End: 2021-10-19 | Stop reason: HOSPADM

## 2021-10-19 RX ORDER — OXYCODONE AND ACETAMINOPHEN 7.5; 325 MG/1; MG/1
1 TABLET ORAL EVERY 4 HOURS PRN
Status: DISCONTINUED | OUTPATIENT
Start: 2021-10-19 | End: 2021-10-20 | Stop reason: HOSPADM

## 2021-10-19 RX ORDER — DEXAMETHASONE SODIUM PHOSPHATE 10 MG/ML
INJECTION INTRAMUSCULAR; INTRAVENOUS AS NEEDED
Status: DISCONTINUED | OUTPATIENT
Start: 2021-10-19 | End: 2021-10-19 | Stop reason: SURG

## 2021-10-19 RX ORDER — DIPHENHYDRAMINE HCL 25 MG
25 CAPSULE ORAL
Status: DISCONTINUED | OUTPATIENT
Start: 2021-10-19 | End: 2021-10-19 | Stop reason: HOSPADM

## 2021-10-19 RX ORDER — OXYCODONE AND ACETAMINOPHEN 7.5; 325 MG/1; MG/1
2 TABLET ORAL EVERY 4 HOURS PRN
Status: DISCONTINUED | OUTPATIENT
Start: 2021-10-19 | End: 2021-10-19 | Stop reason: HOSPADM

## 2021-10-19 RX ORDER — FENTANYL CITRATE 50 UG/ML
INJECTION, SOLUTION INTRAMUSCULAR; INTRAVENOUS AS NEEDED
Status: DISCONTINUED | OUTPATIENT
Start: 2021-10-19 | End: 2021-10-19 | Stop reason: SURG

## 2021-10-19 RX ORDER — ALBUTEROL SULFATE 90 UG/1
1 AEROSOL, METERED RESPIRATORY (INHALATION) EVERY 4 HOURS PRN
Status: DISCONTINUED | OUTPATIENT
Start: 2021-10-19 | End: 2021-10-19 | Stop reason: CLARIF

## 2021-10-19 RX ORDER — ALBUTEROL SULFATE 2.5 MG/3ML
2.5 SOLUTION RESPIRATORY (INHALATION) EVERY 4 HOURS PRN
Status: DISCONTINUED | OUTPATIENT
Start: 2021-10-19 | End: 2021-10-20 | Stop reason: HOSPADM

## 2021-10-19 RX ORDER — FLUMAZENIL 0.1 MG/ML
0.2 INJECTION INTRAVENOUS AS NEEDED
Status: DISCONTINUED | OUTPATIENT
Start: 2021-10-19 | End: 2021-10-19 | Stop reason: HOSPADM

## 2021-10-19 RX ORDER — GABAPENTIN 300 MG/1
300 CAPSULE ORAL 3 TIMES DAILY
Status: DISCONTINUED | OUTPATIENT
Start: 2021-10-19 | End: 2021-10-20 | Stop reason: HOSPADM

## 2021-10-19 RX ORDER — IPRATROPIUM BROMIDE AND ALBUTEROL SULFATE 2.5; .5 MG/3ML; MG/3ML
3 SOLUTION RESPIRATORY (INHALATION) ONCE
Status: COMPLETED | OUTPATIENT
Start: 2021-10-19 | End: 2021-10-19

## 2021-10-19 RX ORDER — POTASSIUM CHLORIDE 750 MG/1
10 TABLET, FILM COATED, EXTENDED RELEASE ORAL DAILY
Status: DISCONTINUED | OUTPATIENT
Start: 2021-10-19 | End: 2021-10-20 | Stop reason: HOSPADM

## 2021-10-19 RX ORDER — GLYCOPYRROLATE 0.2 MG/ML
INJECTION INTRAMUSCULAR; INTRAVENOUS AS NEEDED
Status: DISCONTINUED | OUTPATIENT
Start: 2021-10-19 | End: 2021-10-19 | Stop reason: SURG

## 2021-10-19 RX ORDER — NEOSTIGMINE METHYLSULFATE 0.5 MG/ML
INJECTION, SOLUTION INTRAVENOUS AS NEEDED
Status: DISCONTINUED | OUTPATIENT
Start: 2021-10-19 | End: 2021-10-19 | Stop reason: SURG

## 2021-10-19 RX ORDER — HYDRALAZINE HYDROCHLORIDE 20 MG/ML
5 INJECTION INTRAMUSCULAR; INTRAVENOUS
Status: DISCONTINUED | OUTPATIENT
Start: 2021-10-19 | End: 2021-10-19 | Stop reason: HOSPADM

## 2021-10-19 RX ORDER — FENTANYL CITRATE 50 UG/ML
50 INJECTION, SOLUTION INTRAMUSCULAR; INTRAVENOUS
Status: DISCONTINUED | OUTPATIENT
Start: 2021-10-19 | End: 2021-10-19 | Stop reason: HOSPADM

## 2021-10-19 RX ORDER — SODIUM CHLORIDE 0.9 % (FLUSH) 0.9 %
3-10 SYRINGE (ML) INJECTION AS NEEDED
Status: DISCONTINUED | OUTPATIENT
Start: 2021-10-19 | End: 2021-10-19 | Stop reason: HOSPADM

## 2021-10-19 RX ORDER — IPRATROPIUM BROMIDE AND ALBUTEROL SULFATE 2.5; .5 MG/3ML; MG/3ML
3 SOLUTION RESPIRATORY (INHALATION)
Status: DISCONTINUED | OUTPATIENT
Start: 2021-10-19 | End: 2021-10-20 | Stop reason: HOSPADM

## 2021-10-19 RX ORDER — LIDOCAINE HYDROCHLORIDE 20 MG/ML
INJECTION, SOLUTION INFILTRATION; PERINEURAL AS NEEDED
Status: DISCONTINUED | OUTPATIENT
Start: 2021-10-19 | End: 2021-10-19 | Stop reason: SURG

## 2021-10-19 RX ORDER — OXYCODONE AND ACETAMINOPHEN 7.5; 325 MG/1; MG/1
1 TABLET ORAL EVERY 4 HOURS PRN
Qty: 20 TABLET | Refills: 0 | Status: SHIPPED | OUTPATIENT
Start: 2021-10-19 | End: 2021-11-09

## 2021-10-19 RX ORDER — HYDROCODONE BITARTRATE AND ACETAMINOPHEN 7.5; 325 MG/1; MG/1
1 TABLET ORAL ONCE AS NEEDED
Status: COMPLETED | OUTPATIENT
Start: 2021-10-19 | End: 2021-10-19

## 2021-10-19 RX ORDER — MIDAZOLAM HYDROCHLORIDE 1 MG/ML
1 INJECTION INTRAMUSCULAR; INTRAVENOUS
Status: DISCONTINUED | OUTPATIENT
Start: 2021-10-19 | End: 2021-10-19 | Stop reason: HOSPADM

## 2021-10-19 RX ORDER — PROPOFOL 10 MG/ML
VIAL (ML) INTRAVENOUS AS NEEDED
Status: DISCONTINUED | OUTPATIENT
Start: 2021-10-19 | End: 2021-10-19 | Stop reason: SURG

## 2021-10-19 RX ORDER — ACETAMINOPHEN 500 MG
500 TABLET ORAL ONCE
Status: COMPLETED | OUTPATIENT
Start: 2021-10-19 | End: 2021-10-19

## 2021-10-19 RX ORDER — DIPHENHYDRAMINE HYDROCHLORIDE 50 MG/ML
12.5 INJECTION INTRAMUSCULAR; INTRAVENOUS
Status: DISCONTINUED | OUTPATIENT
Start: 2021-10-19 | End: 2021-10-19 | Stop reason: HOSPADM

## 2021-10-19 RX ORDER — SODIUM CHLORIDE, SODIUM LACTATE, POTASSIUM CHLORIDE, CALCIUM CHLORIDE 600; 310; 30; 20 MG/100ML; MG/100ML; MG/100ML; MG/100ML
9 INJECTION, SOLUTION INTRAVENOUS CONTINUOUS
Status: DISCONTINUED | OUTPATIENT
Start: 2021-10-19 | End: 2021-10-19

## 2021-10-19 RX ORDER — PROMETHAZINE HYDROCHLORIDE 25 MG/1
25 TABLET ORAL ONCE AS NEEDED
Status: DISCONTINUED | OUTPATIENT
Start: 2021-10-19 | End: 2021-10-19 | Stop reason: HOSPADM

## 2021-10-19 RX ORDER — ROCURONIUM BROMIDE 10 MG/ML
INJECTION, SOLUTION INTRAVENOUS AS NEEDED
Status: DISCONTINUED | OUTPATIENT
Start: 2021-10-19 | End: 2021-10-19 | Stop reason: SURG

## 2021-10-19 RX ORDER — MELATONIN
5000 DAILY
Status: DISCONTINUED | OUTPATIENT
Start: 2021-10-19 | End: 2021-10-20 | Stop reason: HOSPADM

## 2021-10-19 RX ORDER — BUPIVACAINE HYDROCHLORIDE AND EPINEPHRINE 5; 5 MG/ML; UG/ML
INJECTION, SOLUTION EPIDURAL; INTRACAUDAL; PERINEURAL AS NEEDED
Status: DISCONTINUED | OUTPATIENT
Start: 2021-10-19 | End: 2021-10-19 | Stop reason: HOSPADM

## 2021-10-19 RX ORDER — FAMOTIDINE 10 MG/ML
20 INJECTION, SOLUTION INTRAVENOUS ONCE
Status: COMPLETED | OUTPATIENT
Start: 2021-10-19 | End: 2021-10-19

## 2021-10-19 RX ORDER — HYDROMORPHONE HYDROCHLORIDE 1 MG/ML
0.5 INJECTION, SOLUTION INTRAMUSCULAR; INTRAVENOUS; SUBCUTANEOUS
Status: DISCONTINUED | OUTPATIENT
Start: 2021-10-19 | End: 2021-10-20 | Stop reason: HOSPADM

## 2021-10-19 RX ORDER — BACLOFEN 10 MG/1
10 TABLET ORAL 3 TIMES DAILY PRN
Status: DISCONTINUED | OUTPATIENT
Start: 2021-10-19 | End: 2021-10-20 | Stop reason: HOSPADM

## 2021-10-19 RX ORDER — ONDANSETRON 2 MG/ML
INJECTION INTRAMUSCULAR; INTRAVENOUS AS NEEDED
Status: DISCONTINUED | OUTPATIENT
Start: 2021-10-19 | End: 2021-10-19 | Stop reason: SURG

## 2021-10-19 RX ORDER — EPHEDRINE SULFATE 50 MG/ML
5 INJECTION, SOLUTION INTRAVENOUS ONCE AS NEEDED
Status: DISCONTINUED | OUTPATIENT
Start: 2021-10-19 | End: 2021-10-19 | Stop reason: HOSPADM

## 2021-10-19 RX ORDER — SODIUM CHLORIDE 0.9 % (FLUSH) 0.9 %
10 SYRINGE (ML) INJECTION AS NEEDED
Status: DISCONTINUED | OUTPATIENT
Start: 2021-10-19 | End: 2021-10-20 | Stop reason: HOSPADM

## 2021-10-19 RX ORDER — NALOXONE HCL 0.4 MG/ML
0.2 VIAL (ML) INJECTION AS NEEDED
Status: DISCONTINUED | OUTPATIENT
Start: 2021-10-19 | End: 2021-10-19 | Stop reason: HOSPADM

## 2021-10-19 RX ORDER — ONDANSETRON 2 MG/ML
4 INJECTION INTRAMUSCULAR; INTRAVENOUS ONCE AS NEEDED
Status: DISCONTINUED | OUTPATIENT
Start: 2021-10-19 | End: 2021-10-19 | Stop reason: HOSPADM

## 2021-10-19 RX ORDER — HYDROMORPHONE HYDROCHLORIDE 1 MG/ML
0.5 INJECTION, SOLUTION INTRAMUSCULAR; INTRAVENOUS; SUBCUTANEOUS
Status: DISCONTINUED | OUTPATIENT
Start: 2021-10-19 | End: 2021-10-19 | Stop reason: HOSPADM

## 2021-10-19 RX ORDER — SODIUM CHLORIDE 0.9 % (FLUSH) 0.9 %
10 SYRINGE (ML) INJECTION EVERY 12 HOURS SCHEDULED
Status: DISCONTINUED | OUTPATIENT
Start: 2021-10-19 | End: 2021-10-20 | Stop reason: HOSPADM

## 2021-10-19 RX ADMIN — FENTANYL CITRATE 25 MCG: 50 INJECTION INTRAMUSCULAR; INTRAVENOUS at 14:04

## 2021-10-19 RX ADMIN — NEOSTIGMINE METHYLSULFATE 2.5 MG: 0.5 INJECTION INTRAVENOUS at 14:18

## 2021-10-19 RX ADMIN — CEFAZOLIN SODIUM 2 G: 2 INJECTION, SOLUTION INTRAVENOUS at 13:44

## 2021-10-19 RX ADMIN — IPRATROPIUM BROMIDE AND ALBUTEROL SULFATE 3 ML: 2.5; .5 SOLUTION RESPIRATORY (INHALATION) at 16:14

## 2021-10-19 RX ADMIN — FENTANYL CITRATE 25 MCG: 50 INJECTION INTRAMUSCULAR; INTRAVENOUS at 14:08

## 2021-10-19 RX ADMIN — GABAPENTIN 300 MG: 300 CAPSULE ORAL at 20:58

## 2021-10-19 RX ADMIN — FAMOTIDINE 20 MG: 10 INJECTION INTRAVENOUS at 13:14

## 2021-10-19 RX ADMIN — SODIUM CHLORIDE, POTASSIUM CHLORIDE, SODIUM LACTATE AND CALCIUM CHLORIDE 9 ML/HR: 600; 310; 30; 20 INJECTION, SOLUTION INTRAVENOUS at 13:14

## 2021-10-19 RX ADMIN — HYDROCODONE BITARTRATE AND ACETAMINOPHEN 1 TABLET: 7.5; 325 TABLET ORAL at 15:27

## 2021-10-19 RX ADMIN — LIDOCAINE HYDROCHLORIDE 100 MG: 20 INJECTION, SOLUTION INFILTRATION; PERINEURAL at 13:41

## 2021-10-19 RX ADMIN — IPRATROPIUM BROMIDE AND ALBUTEROL SULFATE 3 ML: 2.5; .5 SOLUTION RESPIRATORY (INHALATION) at 23:17

## 2021-10-19 RX ADMIN — PROPOFOL 200 MG: 10 INJECTION, EMULSION INTRAVENOUS at 13:41

## 2021-10-19 RX ADMIN — SODIUM CHLORIDE, POTASSIUM CHLORIDE, SODIUM LACTATE AND CALCIUM CHLORIDE 9 ML/HR: 600; 310; 30; 20 INJECTION, SOLUTION INTRAVENOUS at 17:39

## 2021-10-19 RX ADMIN — HYDROMORPHONE HYDROCHLORIDE 0.5 MG: 1 INJECTION, SOLUTION INTRAMUSCULAR; INTRAVENOUS; SUBCUTANEOUS at 16:27

## 2021-10-19 RX ADMIN — DEXAMETHASONE SODIUM PHOSPHATE 8 MG: 10 INJECTION INTRAMUSCULAR; INTRAVENOUS at 13:44

## 2021-10-19 RX ADMIN — FENTANYL CITRATE 25 MCG: 50 INJECTION INTRAMUSCULAR; INTRAVENOUS at 14:37

## 2021-10-19 RX ADMIN — GLYCOPYRROLATE 0.4 MG: 0.2 INJECTION INTRAMUSCULAR; INTRAVENOUS at 14:18

## 2021-10-19 RX ADMIN — SODIUM CHLORIDE, PRESERVATIVE FREE 10 ML: 5 INJECTION INTRAVENOUS at 20:59

## 2021-10-19 RX ADMIN — HYDROMORPHONE HYDROCHLORIDE 0.5 MG: 1 INJECTION, SOLUTION INTRAMUSCULAR; INTRAVENOUS; SUBCUTANEOUS at 15:25

## 2021-10-19 RX ADMIN — FENTANYL CITRATE 25 MCG: 50 INJECTION INTRAMUSCULAR; INTRAVENOUS at 14:44

## 2021-10-19 RX ADMIN — POTASSIUM CHLORIDE 10 MEQ: 750 TABLET, EXTENDED RELEASE ORAL at 20:58

## 2021-10-19 RX ADMIN — FENTANYL CITRATE 50 MCG: 50 INJECTION INTRAMUSCULAR; INTRAVENOUS at 15:15

## 2021-10-19 RX ADMIN — ACETAMINOPHEN 500 MG: 500 TABLET, FILM COATED ORAL at 13:15

## 2021-10-19 RX ADMIN — Medication 5000 UNITS: at 20:58

## 2021-10-19 RX ADMIN — ONDANSETRON 4 MG: 2 INJECTION INTRAMUSCULAR; INTRAVENOUS at 14:18

## 2021-10-19 RX ADMIN — FENTANYL CITRATE 50 MCG: 50 INJECTION INTRAMUSCULAR; INTRAVENOUS at 15:07

## 2021-10-19 RX ADMIN — ROCURONIUM BROMIDE 40 MG: 50 INJECTION INTRAVENOUS at 13:41

## 2021-10-19 RX ADMIN — HYDROMORPHONE HYDROCHLORIDE 0.5 MG: 1 INJECTION, SOLUTION INTRAMUSCULAR; INTRAVENOUS; SUBCUTANEOUS at 20:52
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
## 2021-10-19 NOTE — ANESTHESIA PROCEDURE NOTES
Airway  Urgency: elective    Date/Time: 10/19/2021 1:43 PM  Airway not difficult    General Information and Staff    Patient location during procedure: OR  Anesthesiologist: Kenji De Los Santos MD  CRNA: Peggy Dukes CRNA    Indications and Patient Condition  Indications for airway management: airway protection    Preoxygenated: yes  Mask difficulty assessment: 2 - vent by mask + OA or adjuvant +/- NMBA    Final Airway Details  Final airway type: endotracheal airway      Successful airway: ETT  Cuffed: yes   Successful intubation technique: direct laryngoscopy  Facilitating devices/methods: intubating stylet  Endotracheal tube insertion site: oral  Blade: Wood  Blade size: 3  ETT size (mm): 7.0  Cormack-Lehane Classification: grade I - full view of glottis  Placement verified by: chest auscultation and capnometry   Measured from: lips  ETT/EBT  to lips (cm): 21  Number of attempts at approach: 1  Assessment: lips, teeth, and gum same as pre-op and atraumatic intubation

## 2021-10-19 NOTE — OP NOTE
Operative Note :  Millie Hummel MD      Tia MARINELLI Ricardo  1965    Procedure Date: 10/19/21    Pre-op Diagnosis:  Abdominal wall mass of right upper quadrant [R19.01]  Status post hernia repair [Z98.890, Z87.19]    Post-Operative Diagnosis:  Abdominal wall mass of right upper quadrant [R19.01]  Status post hernia repair [Z98.890, Z87.19]    Procedure:   Excision of abdominal wall mesh with ventral hernia repair    Surgeon: Millie Hummel MD    Assistant: Mehdi Vaughan CSA (Mehdi was responsible for suctioning, retracting, suturing of all surgical incisions, and application of sterile dressings at the completion of the case)    Anesthesia:  General (general endotracheal tube)    Estimated Blood Loss: minimal    Specimens: Abdominal wall mesh    Complications: None    Indications:  The patient is a 56-year-old lady who came to see me recently with a complex mass of the right upper quadrant abdominal wall.  On review of her surgical history, she underwent a ventral hernia repair with preperitoneal polypropylene mesh placed at that location.  I obtained a CT scan which demonstrated the mesh curled upon itself within the abdominal wall creating significant pain with palpation.  I recommended proceeding with abdominal wall mesh excision and primary closure of the muscles with or without bioabsorbable mesh.  She understands the risks of the procedure to include bleeding, possible hernia recurrence, and possible persistent symptoms despite removal of the offending agent.  Despite these risks, she has consented to proceed.    Findings: Circular piece of polypropylene mesh densely adherent to the muscular fascia in the preperitoneal plane    Description of procedure:  The patient was brought to the operating room and placed on the OR table in supine position.  An endotracheal tube was inserted and general anesthesia induced.  Her anterior abdominal wall was prepped and draped in sterile fashion and a surgical  timeout completed.  A right upper quadrant incision was made after instilling 0.5% Marcaine with epinephrine into the skin and subcutaneous fat.  Just deep to the subcutaneous fat there was thick fibrous tissue along what appeared to be the anterior rectus sheath.  There was a ridge of thick tissue that had palpable mesh within it that was excised and the excision carried deep around the perimeter of what appeared to be a clump of polypropylene mesh.  I identified one of the edges and follow this closely circumferentially to excise the entirety of the mesh as 1 piece.  I then inspected the perimeter of the wound and found no persistent polypropylene mesh along the muscular edges.  The mesh was passed off to pathology in formalin.  On inspection of the anterior and posterior rectus sheath, the fascia was incredibly thickened and strong so I did not feel that I would require mesh replacement.  I  the anterior and posterior rectus sheath leaflets and close them individually using interrupted 0 Ethibond figure-of-eight sutures.  I transected the subcutaneous fat off of the anterior rectus sheath circumferentially to help with mobilization of the fascia back together with minimal tension.  Subcutaneous wound was irrigated and suctioned dry and appeared hemostatic.  The incision was then closed primarily using interrupted 3-0 Vicryl deep dermal sutures followed by running 4-0 Vicryl subcuticular suture and topical Exofin glue.  She was then extubated and transferred to PACU in stable condition with all counts correct per nursing.    Millie Hummel MD  General, Robotic, and Endoscopic Surgery  [facility]    [address], [address]  [place] [address]  P: [phone]  F: [phone]

## 2021-10-19 NOTE — ANESTHESIA POSTPROCEDURE EVALUATION
Patient: Tia Silva    Procedure Summary     Date: 10/19/21 Room / Location:  [facility] OR  /  [facility]    Anesthesia Start: 1337 Anesthesia Stop: 1450    Procedure: Abdominal wall exploration with mesh removal and ventral hernia repair (N/A Abdomen) Diagnosis:       Abdominal wall mass of right upper quadrant      Status post hernia repair      (Abdominal wall mass of right upper quadrant [R19.01])      (Status post hernia repair [Z98.890, Z87.19])    Surgeons: Millie Hummel MD Provider: Kenji De Los Santos MD    Anesthesia Type: general ASA Status: 3          Anesthesia Type: general    Vitals  Vitals Value Taken Time   /82 10/19/21 1501   Temp     Pulse 69 10/19/21 1507   Resp     SpO2 98 % 10/19/21 1507   Vitals shown include unvalidated device data.        Post Anesthesia Care and Evaluation    Patient location during evaluation: bedside  Patient participation: complete - patient participated  Level of consciousness: awake  Pain management: adequate  Airway patency: patent  Anesthetic complications: No anesthetic complications    Cardiovascular status: acceptable  Respiratory status: acceptable  Hydration status: acceptable    Comments: */87 (BP Location: Right arm, Patient Position: Sitting)   Pulse 70   Temp 36.6 °C (97.9 °F) (Oral)   Resp 16   SpO2 98%

## 2021-10-20 VITALS
RESPIRATION RATE: 18 BRPM | OXYGEN SATURATION: 91 % | DIASTOLIC BLOOD PRESSURE: 60 MMHG | SYSTOLIC BLOOD PRESSURE: 98 MMHG | HEART RATE: 71 BPM | TEMPERATURE: 97.3 F

## 2021-10-20 LAB
LAB AP CASE REPORT: NORMAL
PATH REPORT.FINAL DX SPEC: NORMAL
PATH REPORT.GROSS SPEC: NORMAL

## 2021-10-20 PROCEDURE — 94799 UNLISTED PULMONARY SVC/PX: CPT

## 2021-10-20 PROCEDURE — G0378 HOSPITAL OBSERVATION PER HR: HCPCS

## 2021-10-20 PROCEDURE — 94640 AIRWAY INHALATION TREATMENT: CPT

## 2021-10-20 PROCEDURE — 99024 POSTOP FOLLOW-UP VISIT: CPT | Performed by: SURGERY

## 2021-10-20 PROCEDURE — 94664 DEMO&/EVAL PT USE INHALER: CPT

## 2021-10-20 RX ADMIN — LEVOTHYROXINE SODIUM 112 MCG: 0.11 TABLET ORAL at 06:13

## 2021-10-20 RX ADMIN — IPRATROPIUM BROMIDE AND ALBUTEROL SULFATE 3 ML: 2.5; .5 SOLUTION RESPIRATORY (INHALATION) at 15:06

## 2021-10-20 RX ADMIN — IPRATROPIUM BROMIDE AND ALBUTEROL SULFATE 3 ML: 2.5; .5 SOLUTION RESPIRATORY (INHALATION) at 07:32

## 2021-10-20 RX ADMIN — OXYCODONE AND ACETAMINOPHEN 1 TABLET: 7.5; 325 TABLET ORAL at 17:17

## 2021-10-20 RX ADMIN — BUDESONIDE AND FORMOTEROL FUMARATE DIHYDRATE 1 PUFF: 160; 4.5 AEROSOL RESPIRATORY (INHALATION) at 09:35

## 2021-10-20 RX ADMIN — PAROXETINE HYDROCHLORIDE HEMIHYDRATE 30 MG: 30 TABLET, FILM COATED ORAL at 06:13

## 2021-10-20 RX ADMIN — OXYCODONE AND ACETAMINOPHEN 1 TABLET: 7.5; 325 TABLET ORAL at 12:07

## 2021-10-20 RX ADMIN — SODIUM CHLORIDE, PRESERVATIVE FREE 10 ML: 5 INJECTION INTRAVENOUS at 08:50

## 2021-10-20 RX ADMIN — OXYCODONE AND ACETAMINOPHEN 1 TABLET: 7.5; 325 TABLET ORAL at 07:48

## 2021-10-20 NOTE — PROGRESS NOTES
Discharge Planning Assessment  [facility]     Patient Name: Tia Silva  MRN: [id number]  Today's Date: 10/20/2021    Admit Date: 10/19/2021     Discharge Needs Assessment     Row Name 10/20/21 1105       Living Environment    Lives With spouse    Name(s) of Who Lives With Patient , Wicho Silva, [phone]    Current Living Arrangements home/apartment/condo    Primary Care Provided by self    Provides Primary Care For no one    Family Caregiver if Needed spouse    Quality of Family Relationships helpful; involved; supportive    Able to Return to Prior Arrangements yes       Resource/Environmental Concerns    Resource/Environmental Concerns none       Transition Planning    Patient/Family Anticipates Transition to home with family    Patient/Family Anticipated Services at Transition none    Transportation Anticipated family or friend will provide       Discharge Needs Assessment    Equipment Currently Used at Home none    Concerns to be Addressed no discharge needs identified; denies needs/concerns at this time    Discharge Coordination/Progress Home               Discharge Plan     Row Name 10/20/21 5265       Plan    Plan Home with     Patient/Family in Agreement with Plan yes    Plan Comments CCP spoke with pt re: d/c planning. [place]  [place] [place] [place] uses no DME, and has no h/o HH/SNF. Pt has been to outpatient PT in [place] in the past. Pt uses [facility] [place] but is enrolled in [facility] per her preference. CCP to follow for needs. Luisa Mcgovern LCSW              Continued Care and Services - Admitted Since 10/19/2021    Coordination has not been started for this encounter.       Expected Discharge Date and Time     Expected Discharge Date Expected Discharge Time    Oct 19, 2021          Demographic Summary     Row Name 10/20/21 1108       General Information    Admission Type observation    Arrived From home     Referral Source admission list    Reason for Consult discharge planning    Preferred Language English               Functional Status     Row Name 10/20/21 1107       Functional Status    Usual Activity Tolerance good    Current Activity Tolerance good       Functional Status, IADL    Medications independent    Meal Preparation independent    Housekeeping independent    Laundry independent    Shopping independent       Mental Status Summary    Recent Changes in Mental Status/Cognitive Functioning no changes               Psychosocial    No documentation.                Abuse/Neglect    No documentation.                Legal    No documentation.                Substance Abuse    No documentation.                Patient Forms    No documentation.                   Laura Mcgovern LCSW

## 2021-10-20 NOTE — DISCHARGE SUMMARY
Discharge Summary    Patient name: Tia Silva    Medical record number: [id number]    Admission date: 10/19/2021  Discharge date: 10/20/2021     Attending physician: Millie Hummel MD    Primary care physician: Donovan Harrington MD    Consulting physician(s): None    Condition on discharge: improving    Admitting diagnosis: Abdominal mass    Final diagnosis: Same    Procedures: Excision of abdominal wall mesh with ventral hernia repair    History of present illness: The patient is a  56 y.o. female that was admitted to the hospital with a painful phlegmonous mass of the right upper quadrant abdominal wall adjacent to where she previously underwent a ventral hernia repair with polypropylene mesh placement at an outside hospital a few years ago.  I recommended we proceed to the operating room for excision of his abdominal wall mesh and ventral hernia repair.  She understands the risks of the procedure and has consented to proceed.    Hospital course: She was brought in electively for excision of the abdominal wall mesh and ventral hernia repair, which was achieved without difficulty.  In the recovery room, she was having difficulty weaning off of oxygen due to inability to take a deep breath secondary to abdominal pain.  She was therefore kept overnight for observation and her oxygen was able to be weaned to room air.  She is being discharged home on the afternoon of postoperative day #1 with instructions to follow-up with me in 2 weeks.    Discharge medications:      Discharge Medications      New Medications      Instructions Start Date   oxyCODONE-acetaminophen 7.5-325 MG per tablet  Commonly known as: Percocet   1 tablet, Oral, Every 4 Hours PRN         Continue These Medications      Instructions Start Date   Anoro Ellipta 62.5-25 MCG/INH aerosol powder  inhaler  Generic drug: umeclidinium-vilanterol   1 puff, Inhalation, Daily      baclofen 10 MG tablet  Commonly known as: LIORESAL   3 Times Daily PRN       Cholecalciferol 125 MCG (5000 UT) tablet   5,000 Units, Oral, Daily      Dulera 100-5 MCG/ACT inhaler  Generic drug: mometasone-formoterol   Daily PRN      gabapentin 300 MG capsule  Commonly known as: Neurontin   300 mg, Oral, 3 Times Daily      levothyroxine 112 MCG tablet  Commonly known as: SYNTHROID, LEVOTHROID   112 mcg, Oral, Daily      magnesium oxide 400 MG tablet  Commonly known as: MAG-OX   400 mg, Oral, Daily PRN      OXYCODONE ER PO   oxycodone oral 7.5mg 1 po tid prn   Active      PARoxetine 30 MG tablet  Commonly known as: PAXIL   30 mg, Oral, Every Morning      potassium chloride 10 MEQ CR tablet   10 mEq, Oral, Daily      Trelegy Ellipta 200-62.5-25 MCG/INH inhaler  Generic drug: Fluticasone-Umeclidin-Vilant   1 puff, Inhalation, Daily      Ventolin  (90 Base) MCG/ACT inhaler  Generic drug: albuterol sulfate HFA   Ventolin HFA 90 mcg/actuation inhalation HFA aerosol inhaler inhale 1 puff (90 mcg) by inhalation route every 6 hours as needed   Active         Stop These Medications    Chlorhexidine Gluconate Cloth 2 % pads            Discharge instructions:    - Resume regular diet as tolerated.  - No lifting anything heavier than 15 pounds for 8 weeks postop. You may resume all other normal activities as tolerated once your pain level allows.  - No driving while taking narcotic pain medications.  - You may resume showering, no tub bathing for two weeks while your incisions heal.  - Wash your incisions with soap and water daily in the shower, pat dry, and leave open to air.    Follow-up appointment: Follow up with Millie Hummel MD in the office in 2 weeks. Call for appointment at [phone].    CODE STATUS: Full code

## 2021-10-20 NOTE — PLAN OF CARE
Goal Outcome Evaluation:  Plan of Care Reviewed With: patient           Outcome Summary: VSS, pain mnaged with dilaudid x1, on 3LO2, incision approximated wit liquid bandaid, on reg diet, IVF infusing at KVO, up with asst, voiding freely, no c/o nausea,  at bedside.

## 2021-10-20 NOTE — PLAN OF CARE
Goal Outcome Evaluation:  Plan of Care Reviewed With: patient        Progress: improving  Outcome Summary: Percocet given for pain with adequate relief. Abd incision with dermabond. Ambulated in hallway. Stable for discharge today.   age(85 years old or older)

## 2021-10-20 NOTE — PAYOR COMM NOTE
"Maureen Silva (56 y.o. Female)     PLEASE SEE ATTACHED FOR OBSERVATION AUTH     PLEASE CALL   OR  [phone]    THANK YOU    POLLO BRODY LPN CCP            Date of Birth Social Security Number Address Home Phone MRN    1965  [address]  [place] [address] [phone] [id number]    [employer]aritan Marital Status             Hoahaoism        Admission Date Admission Type Admitting Provider Attending Provider Department, Room/Bed    10/19/21 Elective Millie Hummel MD Humphrey, Danielle, MD [address], P681/1    Discharge Date Discharge Disposition Discharge Destination                         Attending Provider: Millie Hummel MD    Allergies: Bupropion    Isolation: None   Infection: None   Code Status: Not on file   Advance Care Planning Activity    Ht: 170.2 cm (67\")   Wt: 100 kg (221 lb)    Admission Cmt: None   Principal Problem: Abdominal wall mass of right upper quadrant [R19.01] More...                 Active Insurance as of 10/19/2021     Primary Coverage     Payor Plan Insurance Group Employer/Plan Group    [employer] [place] [employer] [place]ICA[place]      Payor Plan Address Payor Plan Phone Number Payor Plan Fax Number Effective Dates    [address] [address] [phone]  6/16/2021 - None Entered    [facility] [id number]       Subscriber Name Subscriber Birth Date Member ID       MAUREEN SILVA 1965 [id number]                 Emergency Contacts      (Rel.) Home Phone Work Phone Mobile Phone    MARCIA SILVA (Spouse) -- -- [phone]               History & Physical      Millie Hummel MD at 10/19/21 1248          H&P reviewed. The patient was examined and there are no changes to the H&P.          Electronically signed by Millie Hummel MD at 10/19/21 1309   Source Note        General Surgery  Established Patient Office Visit    CC: Follow-up abdominal wall mass, abdominal pain    HPI: The patient is a pleasant 56 y.o. year-old lady who " returns to see me today to discuss her recent CT abdomen/pelvis that was obtained for work-up of a complex abdominal wall mass that was found almost a year ago within the epigastrium/right upper quadrant.  She has had daily intermittent right upper quadrant abdominal pain at the site of previous hernia repair for the last 4 years.  The hernia repair was done in 2016 by Dr. Oh in [place], [place] after she developed a hernia through what appears to be the epigastric incision from a previous laparoscopic cholecystectomy.  She underwent open ventral hernia repair with mesh (the operative report states it was a circular polypropylene mesh with tails similar to Ventralex) and says the pain started almost immediately thereafter.  She says the pain comes on spontaneously with no clear inciting factors.  It will occur daily and last for anywhere from a minute upwards to about 30 minutes before subsiding.  She says at times it is so severe it doubles her over and she is unable to move.  She has no associated nausea, vomiting, or change in her bowel habits.  She has not noticed a bulge at the site of her pain along the upper abdominal wall.  She has had work-up for this involving a CT abdomen/pelvis that was done in November 2020 demonstrating a complex seroma versus scar at the site of her hernia repair.  There was no sign of recurrent hernia on imaging.  She then saw a general surgeon at [facility], Stacy Sanchez MD, who reviewed the imaging done at [place] and recommended ultrasound-guided aspiration.  When she presented for the ultrasound-guided aspiration, it was noted that the mass had become more solid and was no longer fluid-filled.  The aspiration was therefore unsuccessful.  She was then referred to see me for a second opinion as she wishes to have surgery to remove it and Dr. Sanchez did not recommend surgical removal.  After I saw her I recommended checking a new CT scan that I can personally  review, and it does indeed show mesh clamped within her abdominal wall muscles anterior to the liver within the epigastrium/right upper quadrant.    Past Medical History:   Hypothyroidism  COPD  Asthma  History of nephrolithiasis  Back problems  Frequent bladder infections  History of bronchitis  History of seizures  History of stroke    Past Surgical History:   Laparoscopic cholecystectomy  Hysterectomy  Lithotripsy  Tubal ligation  Cystoscopy with bladder biopsy  Incision and drainage of left breast abscess  Left breast excisional biopsy x2  Ventral hernia repair with mesh (?2016 in [place], [place])  Colonoscopy (2015 in [place], [place]-no problems reported)    Medications:   Albuterol inhaler every 6 hours as needed  Azithromycin Z-Kody as needed for pneumonia/bronchitis  Baclofen 10 mg 3 times daily  Cholecalciferol 5000 units daily  Erythromycin 500 mg twice daily  Fluticasone-Umeclidin-Vilant inhaler 1 puff daily  Gabapentin 300 mg 3 times daily  Levothyroxine 75 mcg daily  Magnesium oxide 400 mg daily  Dulera inhaler 2 puffs twice daily  Paroxetine 30 mg daily  Potassium chloride 10 mEq daily  Oxycodone 7.5 mg 3 times daily as needed for pain    Allergies: Buspirone/bupropion    Family History: Father with history of colon/lung cancer, brother with kidney cancer    Social History: , smokes 1 pack/day cigarettes for the last 25 years, no regular alcohol use, no illicit drug use    ROS:   Constitutional: Positive for activity change and fatigue; negative for fevers or chills  HENT: Negative for hearing loss or runny nose  Eyes: Negative for vision changes or scleral icterus  Respiratory: Negative for cough or shortness of breath  Cardiovascular: Negative for chest pain or heart palpitations  Gastrointestinal: Positive for abdominal pain; negative for abdominal distension, nausea, vomiting, constipation, melena, or hematochezia  Genitourinary: Negative for hematuria or dysuria  Musculoskeletal: Positive  for back pain, neck pain, and neck stiffness; negative for joint swelling or gait instability  Neurologic: Negative for tremors or seizures  Psychiatric: Negative for suicidal ideations or agitation  All other systems reviewed and negative    Physical Exam:  Height: 170 cm  Weight: 99.2 kg  BMI: 33.2  General: No acute distress, well-nourished & well-developed  HEAD: normocephalic, atraumatic  EYES: normal conjunctiva, sclera anicteric  EARS: grossly normal hearing  NECK: supple, no thyromegaly  CARDIOVASCULAR: regular rate and rhythm  RESPIRATORY: clear to auscultation bilaterally  GASTROINTESTINAL: soft, nontender, non-distended, well-healed scars from previous laparoscopic cholecystectomy and open ventral hernia repair within the epigastric abdominal wall, no palpable recurrent hernia, no overlying skin erythema or fluctuance, subtle soft tissue nodularity within the area of concern feels like abnormal scar  MUSCULOSKELETAL: normal gait and station. No gross extremity abnormalities  PSYCHIATRIC: oriented x3, normal mood and affect    IMAGING:  CT ABD/PELVIS (Aug 2021):  CONCLUSION:   CT scan of the abdomen and pelvis with oral and IV contrast demonstrating fatty liver.  Post cholecystectomy and hysterectomy.  Tiny nonobstructing stones are seen in both kidneys.  Chronic postoperative change and scarring in the anterior abdominal wall in the right upper quadrant is stable.    ASSESSMENT & PLAN  Mrs. Silva is a 56-year-old lady with significant abdominal pain at the site of previous ventral hernia repair with polypropylene mesh.  I reviewed the recent CT abdomen/pelvis that was done and showed her the mesh stuck within her abdominal wall muscles and clogged up into a fairly large phlegmon of inflammation.  Given the severity of her pain, I would recommend we proceed with surgical removal of this mesh and then perform a ventral hernia repair, as there will be a moderate sized fascial defect after the mesh is removed.   Performing the ventral hernia repair will be somewhat difficult as the muscles are likely weakened from previous instrumentation/surgery so I would prefer to place a piece of absorbable mesh such as BioA that would help to reinforce the weakened muscles without all of the negative long-term effects of permanent mesh.  I did discuss with her that surgery is not guaranteed to provide her relief, and could potentially make her feel even worse.  However, there is the possibility that removal of the polypropylene mesh could help to alleviate much of her abdominal pain and give her a significant improvement in her quality of life.  After discussing the risks and benefits, she would like to go ahead and proceed with surgery and I am happy to get it scheduled for anytime in early to mid October.    Millie Hummel MD  General, Robotic, and Endoscopic Surgery  [facility]    [address], [address]  [place] [address]  P: [phone]  F: [phone]         Electronically signed by Millie Hummel MD at 09/24/21 3989             Millie Hummel MD at 09/21/21 0972        General Surgery  Established Patient Office Visit    CC: Follow-up abdominal wall mass, abdominal pain    HPI: The patient is a pleasant 56 y.o. year-old lady who returns to see me today to discuss her recent CT abdomen/pelvis that was obtained for work-up of a complex abdominal wall mass that was found almost a year ago within the epigastrium/right upper quadrant.  She has had daily intermittent right upper quadrant abdominal pain at the site of previous hernia repair for the last 4 years.  The hernia repair was done in 2016 by Dr. Oh in [place], [place] after she developed a hernia through what appears to be the epigastric incision from a previous laparoscopic cholecystectomy.  She underwent open ventral hernia repair with mesh (the operative report states it was a circular polypropylene mesh with tails similar to  Ventralex) and says the pain started almost immediately thereafter.  She says the pain comes on spontaneously with no clear inciting factors.  It will occur daily and last for anywhere from a minute upwards to about 30 minutes before subsiding.  She says at times it is so severe it doubles her over and she is unable to move.  She has no associated nausea, vomiting, or change in her bowel habits.  She has not noticed a bulge at the site of her pain along the upper abdominal wall.  She has had work-up for this involving a CT abdomen/pelvis that was done in November 2020 demonstrating a complex seroma versus scar at the site of her hernia repair.  There was no sign of recurrent hernia on imaging.  She then saw a general surgeon at [facility], Stacy Sanchez MD, who reviewed the imaging done at [place] and recommended ultrasound-guided aspiration.  When she presented for the ultrasound-guided aspiration, it was noted that the mass had become more solid and was no longer fluid-filled.  The aspiration was therefore unsuccessful.  She was then referred to see me for a second opinion as she wishes to have surgery to remove it and Dr. Sanchez did not recommend surgical removal.  After I saw her I recommended checking a new CT scan that I can personally review, and it does indeed show mesh clamped within her abdominal wall muscles anterior to the liver within the epigastrium/right upper quadrant.    Past Medical History:   Hypothyroidism  COPD  Asthma  History of nephrolithiasis  Back problems  Frequent bladder infections  History of bronchitis  History of seizures  History of stroke    Past Surgical History:   Laparoscopic cholecystectomy  Hysterectomy  Lithotripsy  Tubal ligation  Cystoscopy with bladder biopsy  Incision and drainage of left breast abscess  Left breast excisional biopsy x2  Ventral hernia repair with mesh (?2016 in [place], [place])  Colonoscopy (2015 in [place], [place]-no problems  reported)    Medications:   Albuterol inhaler every 6 hours as needed  Azithromycin Z-Kody as needed for pneumonia/bronchitis  Baclofen 10 mg 3 times daily  Cholecalciferol 5000 units daily  Erythromycin 500 mg twice daily  Fluticasone-Umeclidin-Vilant inhaler 1 puff daily  Gabapentin 300 mg 3 times daily  Levothyroxine 75 mcg daily  Magnesium oxide 400 mg daily  Dulera inhaler 2 puffs twice daily  Paroxetine 30 mg daily  Potassium chloride 10 mEq daily  Oxycodone 7.5 mg 3 times daily as needed for pain    Allergies: Buspirone/bupropion    Family History: Father with history of colon/lung cancer, brother with kidney cancer    Social History: , smokes 1 pack/day cigarettes for the last 25 years, no regular alcohol use, no illicit drug use    ROS:   Constitutional: Positive for activity change and fatigue; negative for fevers or chills  HENT: Negative for hearing loss or runny nose  Eyes: Negative for vision changes or scleral icterus  Respiratory: Negative for cough or shortness of breath  Cardiovascular: Negative for chest pain or heart palpitations  Gastrointestinal: Positive for abdominal pain; negative for abdominal distension, nausea, vomiting, constipation, melena, or hematochezia  Genitourinary: Negative for hematuria or dysuria  Musculoskeletal: Positive for back pain, neck pain, and neck stiffness; negative for joint swelling or gait instability  Neurologic: Negative for tremors or seizures  Psychiatric: Negative for suicidal ideations or agitation  All other systems reviewed and negative    Physical Exam:  Height: 170 cm  Weight: 99.2 kg  BMI: 33.2  General: No acute distress, well-nourished & well-developed  HEAD: normocephalic, atraumatic  EYES: normal conjunctiva, sclera anicteric  EARS: grossly normal hearing  NECK: supple, no thyromegaly  CARDIOVASCULAR: regular rate and rhythm  RESPIRATORY: clear to auscultation bilaterally  GASTROINTESTINAL: soft, nontender, non-distended, well-healed scars  from previous laparoscopic cholecystectomy and open ventral hernia repair within the epigastric abdominal wall, no palpable recurrent hernia, no overlying skin erythema or fluctuance, subtle soft tissue nodularity within the area of concern feels like abnormal scar  MUSCULOSKELETAL: normal gait and station. No gross extremity abnormalities  PSYCHIATRIC: oriented x3, normal mood and affect    IMAGING:  CT ABD/PELVIS (Aug 2021):  CONCLUSION:   CT scan of the abdomen and pelvis with oral and IV contrast demonstrating fatty liver.  Post cholecystectomy and hysterectomy.  Tiny nonobstructing stones are seen in both kidneys.  Chronic postoperative change and scarring in the anterior abdominal wall in the right upper quadrant is stable.    ASSESSMENT & PLAN  Mrs. Silva is a 56-year-old lady with significant abdominal pain at the site of previous ventral hernia repair with polypropylene mesh.  I reviewed the recent CT abdomen/pelvis that was done and showed her the mesh stuck within her abdominal wall muscles and clogged up into a fairly large phlegmon of inflammation.  Given the severity of her pain, I would recommend we proceed with surgical removal of this mesh and then perform a ventral hernia repair, as there will be a moderate sized fascial defect after the mesh is removed.  Performing the ventral hernia repair will be somewhat difficult as the muscles are likely weakened from previous instrumentation/surgery so I would prefer to place a piece of absorbable mesh such as BioA that would help to reinforce the weakened muscles without all of the negative long-term effects of permanent mesh.  I did discuss with her that surgery is not guaranteed to provide her relief, and could potentially make her feel even worse.  However, there is the possibility that removal of the polypropylene mesh could help to alleviate much of her abdominal pain and give her a significant improvement in her quality of life.  After discussing the  risks and benefits, she would like to go ahead and proceed with surgery and I am happy to get it scheduled for anytime in early to mid October.    Millie Hummel MD  General, Robotic, and Endoscopic Surgery  [facility]    [address], [address]  [place] [address]  P: [phone]  F: [phone]         Electronically signed by Millie Hummel MD at 09/24/21 1019          Operative/Procedure Notes (all)      Millie Hummel MD at 10/19/21 1356  Version 1 of 1       Operative Note :  MD Tia Franklin  1965    Procedure Date: 10/19/21    Pre-op Diagnosis:  Abdominal wall mass of right upper quadrant [R19.01]  Status post hernia repair [Z98.890, Z87.19]    Post-Operative Diagnosis:  Abdominal wall mass of right upper quadrant [R19.01]  Status post hernia repair [Z98.890, Z87.19]    Procedure:   Excision of abdominal wall mesh with ventral hernia repair    Surgeon: Millie Hummel MD    Assistant: Mehdi Vaughan CSA (Mehdi was responsible for suctioning, retracting, suturing of all surgical incisions, and application of sterile dressings at the completion of the case)    Anesthesia:  General (general endotracheal tube)    Estimated Blood Loss: minimal    Specimens: Abdominal wall mesh    Complications: None    Indications:  The patient is a 56-year-old lady who came to see me recently with a complex mass of the right upper quadrant abdominal wall.  On review of her surgical history, she underwent a ventral hernia repair with preperitoneal polypropylene mesh placed at that location.  I obtained a CT scan which demonstrated the mesh curled upon itself within the abdominal wall creating significant pain with palpation.  I recommended proceeding with abdominal wall mesh excision and primary closure of the muscles with or without bioabsorbable mesh.  She understands the risks of the procedure to include bleeding, possible hernia recurrence, and possible  persistent symptoms despite removal of the offending agent.  Despite these risks, she has consented to proceed.    Findings: Circular piece of polypropylene mesh densely adherent to the muscular fascia in the preperitoneal plane    Description of procedure:  The patient was brought to the operating room and placed on the OR table in supine position.  An endotracheal tube was inserted and general anesthesia induced.  Her anterior abdominal wall was prepped and draped in sterile fashion and a surgical timeout completed.  A right upper quadrant incision was made after instilling 0.5% Marcaine with epinephrine into the skin and subcutaneous fat.  Just deep to the subcutaneous fat there was thick fibrous tissue along what appeared to be the anterior rectus sheath.  There was a ridge of thick tissue that had palpable mesh within it that was excised and the excision carried deep around the perimeter of what appeared to be a clump of polypropylene mesh.  I identified one of the edges and follow this closely circumferentially to excise the entirety of the mesh as 1 piece.  I then inspected the perimeter of the wound and found no persistent polypropylene mesh along the muscular edges.  The mesh was passed off to pathology in formalin.  On inspection of the anterior and posterior rectus sheath, the fascia was incredibly thickened and strong so I did not feel that I would require mesh replacement.  I  the anterior and posterior rectus sheath leaflets and close them individually using interrupted 0 Ethibond figure-of-eight sutures.  I transected the subcutaneous fat off of the anterior rectus sheath circumferentially to help with mobilization of the fascia back together with minimal tension.  Subcutaneous wound was irrigated and suctioned dry and appeared hemostatic.  The incision was then closed primarily using interrupted 3-0 Vicryl deep dermal sutures followed by running 4-0 Vicryl subcuticular suture and topical  Exofin glue.  She was then extubated and transferred to PACU in stable condition with all counts correct per nursing.    Millie Hummel MD  General, Robotic, and Endoscopic Surgery  [facility] ([facility]    [address], [address]  [place] [address]  P: [phone]  F: [phone]       Electronically signed by Millie Hummel MD at 10/19/21 0508

## 2021-10-21 NOTE — PROGRESS NOTES
Case Management Discharge Note      Final Note: Home         Selected Continued Care - Discharged on 10/20/2021 Admission date: 10/19/2021 - Discharge disposition: Home or Self Care    Destination    No services have been selected for the patient.              Durable Medical Equipment    No services have been selected for the patient.              Dialysis/Infusion    No services have been selected for the patient.              Home Medical Care    No services have been selected for the patient.              Therapy    No services have been selected for the patient.              Community Resources    No services have been selected for the patient.              Community & DME    No services have been selected for the patient.                  Transportation Services  Private: Car    Final Discharge Disposition Code: 01 - home or self-care

## 2021-11-09 ENCOUNTER — OFFICE VISIT (OUTPATIENT)
Dept: SURGERY | Facility: CLINIC | Age: 56
End: 2021-11-09

## 2021-11-09 VITALS — BODY MASS INDEX: 34.21 KG/M2 | HEIGHT: 67 IN | WEIGHT: 218 LBS

## 2021-11-09 DIAGNOSIS — Z09 SURGICAL FOLLOWUP: Primary | ICD-10-CM

## 2021-11-09 DIAGNOSIS — R19.01 ABDOMINAL WALL MASS OF RIGHT UPPER QUADRANT: ICD-10-CM

## 2021-11-09 DIAGNOSIS — Z87.19 STATUS POST HERNIA REPAIR: ICD-10-CM

## 2021-11-09 DIAGNOSIS — Z98.890 STATUS POST HERNIA REPAIR: ICD-10-CM

## 2021-11-09 PROCEDURE — 99024 POSTOP FOLLOW-UP VISIT: CPT | Performed by: SURGERY

## 2021-11-09 NOTE — PROGRESS NOTES
CHIEF COMPLAINT:   Chief Complaint   Patient presents with   • Post-op     Excision of abdominal wall mesh with ventral hernia repair 10/19/21       HISTORY OF PRESENT ILLNESS:  This is a 56 y.o. female who presents for a post-operative visit after undergoing abdominal wall mesh excision and ventral hernia repair on 10/14/2021. She has struggled with post-operative pain, made worse when her sister in law had a seizure and fell over requiring the patient to catch her and support her weight last weekend. She has had no erythema or drainage from the incision.    Pathology:   Abdominal Wall Soft Tissue and Mesh, Excision:               A. Grossly identified mesh with dense adherent fibroadipose tissue and cicatrix.               B. No significant inflammation nor evidence of malignancy.    PHYSICAL EXAM:  Lungs: Clear  Heart: RRR  ABD: Incision is healing well without any erythema or signs of infection.  Ext: no significant edema, calves nontender    A/P:  This is a 56 y.o. female patient who is S/P abdominal wall mesh excision and ventral hernia repair on 10/14/2021    She is healing well. She should continue to avoid lifting anything heavy for another 6 weeks. I would like to see her back in about a month to recheck her incision and recheck her symptoms.    Millie Hummel MD  General, Robotic, and Endoscopic Surgery  [facility]    [address], [address]  [place] [address]  P: [phone]  F: [phone]

## 2021-12-30 ENCOUNTER — OFFICE VISIT (OUTPATIENT)
Dept: SURGERY | Facility: CLINIC | Age: 56
End: 2021-12-30

## 2021-12-30 DIAGNOSIS — N81.6 RECTOCELE: Primary | ICD-10-CM

## 2021-12-30 DIAGNOSIS — Z09 SURGICAL FOLLOWUP: ICD-10-CM

## 2021-12-30 PROCEDURE — 99024 POSTOP FOLLOW-UP VISIT: CPT | Performed by: SURGERY

## 2022-04-08 ENCOUNTER — TELEPHONE (OUTPATIENT)
Dept: UROLOGY | Facility: CLINIC | Age: 57
End: 2022-04-08

## 2022-04-08 NOTE — TELEPHONE ENCOUNTER
----- Message from Blanca Gibbs LPN sent at 4/8/2022  1:05 PM EDT -----  Can we schedule this patient for a fol.low up please, she had a ct recently so a KUB isnt necessary. With in the next month please. For an annual with CT 4/5/2022

## 2022-04-12 ENCOUNTER — OFFICE VISIT (OUTPATIENT)
Dept: SURGERY | Facility: CLINIC | Age: 57
End: 2022-04-12

## 2022-04-12 VITALS — WEIGHT: 220.4 LBS | HEIGHT: 67 IN | BODY MASS INDEX: 34.59 KG/M2

## 2022-04-12 DIAGNOSIS — R19.00 ABDOMINAL WALL BULGE: Primary | ICD-10-CM

## 2022-04-12 DIAGNOSIS — Z87.19 HISTORY OF VENTRAL HERNIA REPAIR: ICD-10-CM

## 2022-04-12 DIAGNOSIS — Z98.890 HISTORY OF VENTRAL HERNIA REPAIR: ICD-10-CM

## 2022-04-12 PROCEDURE — 99213 OFFICE O/P EST LOW 20 MIN: CPT | Performed by: SURGERY

## 2022-04-12 RX ORDER — PAROXETINE HYDROCHLORIDE 40 MG/1
40 TABLET, FILM COATED ORAL EVERY MORNING
Status: ON HOLD | COMMUNITY
Start: 2022-02-23 | End: 2022-07-23

## 2022-04-12 RX ORDER — HYDROCODONE BITARTRATE AND ACETAMINOPHEN 5; 325 MG/1; MG/1
TABLET ORAL
Status: ON HOLD | COMMUNITY
Start: 2022-03-09 | End: 2022-07-23

## 2022-04-12 RX ORDER — PSEUDOEPHEDRINE HCL 30 MG
100 TABLET ORAL AS NEEDED
Status: ON HOLD | COMMUNITY
Start: 2022-03-23 | End: 2022-07-23

## 2022-04-12 RX ORDER — GABAPENTIN 100 MG/1
100 CAPSULE ORAL 2 TIMES DAILY
COMMUNITY
Start: 2022-03-23

## 2022-04-12 NOTE — PROGRESS NOTES
General Surgery  Established Patient Office Visit    CC: Follow-up of abdominal wall hernia status post repair last October    HPI: The patient is a pleasant 57 y.o. year-old lady who presents today for evaluation of a bulge that she recently noticed within the right upper quadrant abdominal wall just above the scar from a previous open abdominal wall hernia repair with mesh excision that I performed in October of last year.  She says that in the last few months her 13-year-old daughter has been diagnosed with a focal neurologic disorder with sporadic episodes of paralysis, blindness, and deafness that happened quite suddenly.  She and her daughter were crossing the street when her daughter's legs gave out and her daughter fell in the street due to the spontaneous paralysis.  She had to lift her daughter and carry her to the sidewalk, and she says shortly thereafter she felt a severe pain in the right upper abdomen.  She has since noticed a bulge whenever she is coughing or straining to have a bowel movement.  The bulge is located right above the scar from her previous open ventral hernia repair with mesh excision.  She spoke to her primary care doctor who referred her to [facility] where a CT abdomen/pelvis reportedly showed no abnormalities of the abdominal wall.  I do not have access to the images, and she forgot to bring the disc with her today for my personal review.  She previously underwent a ventral hernia repair with mesh in 2016 in [place], [place] with a preperitoneal piece of Ventralex mesh placed that cause significant pain of the abdomen at that location prompting mesh excision with primary hernia repair by me in October.    Past Medical History:   Hypothyroidism  COPD  Asthma  History of nephrolithiasis  Back problems  Frequent bladder infections  History of bronchitis  History of seizures  History of stroke     Past Surgical History:   Laparoscopic  cholecystectomy  Hysterectomy  Lithotripsy  Tubal ligation  Cystoscopy with bladder biopsy  Incision and drainage of left breast abscess  Left breast excisional biopsy x2  Ventral hernia repair with mesh (?2016 in [place], [place])  Colonoscopy (2015 in [place], [place]-no problems reported)  Abdominal wall mesh excision with primary ventral hernia repair (October 2021 by me)    Medications:   Albuterol inhaler as needed  Anoro Ellipta inhaler 1 puff daily  Baclofen 10 mg 3 times daily as needed for muscle spasms  Cholecalciferol 5000 units daily  Colace 100 mg as needed  Trelegy Ellipta inhaler 1 puff daily  Gabapentin 100 g twice daily  Norco 5/325 mg daily as needed for pain  Levothyroxine 112 mcg daily  Magnesium oxide 400 mg daily as needed  Dulera inhaler daily as needed  Paroxetine 40 mg daily  Potassium chloride 10 mill equivalents daily    Allergies: Bupropion (vomiting/delirium)    Family History: Father with history of colon/lung cancer, brother with kidney cancer     Social History: , smokes 1 pack/day cigarettes for the last 25 years, no regular alcohol use, no illicit drug use    ROS:   Constitutional: Negative for fevers or chills  HENT: Negative for hearing loss or runny nose  Eyes: Negative for vision changes or scleral icterus  Respiratory: Negative for cough or shortness of breath  Cardiovascular: Negative for chest pain or heart palpitations  Gastrointestinal: Positive for abdominal pain; negative for abdominal distension, nausea, vomiting, constipation, melena, or hematochezia  Genitourinary: Negative for hematuria or dysuria  Musculoskeletal: Negative for joint swelling or gait instability  Neurologic: Negative for tremors or seizures  Psychiatric: Negative for suicidal ideations or agitation  All other systems reviewed and negative    Physical Exam:  Height: 170 cm  Weight: 100 kg  BMI: 34.52  General: No acute distress, well-nourished & well-developed  HEAD: normocephalic,  atraumatic  EYES: normal conjunctiva, sclera anicteric  EARS: grossly normal hearing  NECK: supple, no thyromegaly  CARDIOVASCULAR: regular rate and rhythm  RESPIRATORY: clear to auscultation bilaterally  GASTROINTESTINAL: soft, nontender, non-distended, well-healed surgical scar on the right upper quadrant with a possible subtle bulge just above the scar when she is standing upright and coughing  MUSCULOSKELETAL: normal gait and station. No gross extremity abnormalities  PSYCHIATRIC: oriented x3, normal mood and affect    IMAGING:  CT ABD/PELVIS (4/5/2022, [facility]):  FINDINGS:   The visualized lung bases are unremarkable.  The visualized portions of the heart are within normal limits.   There is mild fatty infiltration of the liver, no discrete lesion.  There are surgical clips in the gallbladder fossa consistent with a prior cholecystectomy.  Normal spleen.  Normal pancreas.   Normal bilateral adrenal glands.   No obstructive uropathy or suspicious solid renal lesion, there are punctate nonobstructing renal stones.   Normal visualized stomach.  Normal small intestine.  Normal colon.  The appendix is visualized and appears normal.  Appendix seen on coronal recon images 29 through 35   There is diffuse atherosclerotic calcification of the abdominal aorta, without a demonstrated aneurysm.  Normal inferior vena cava.  Normal retroperitoneum.   Normal urinary bladder.   Normal abdominal wall.  There are diffuse degenerative changes of the visualized lumbar spine, and pelvis.     IMPRESSION:  Mild fatty infiltration of the liver, no discrete lesion   Punctate nonobstructing bilateral renal stones   No free intraperitoneal fluid, air, or suspicious adenopathy.  Normal appendix visualized     ASSESSMENT & PLAN  Mrs. Silva is a 57-year-old lady with a subtle bulge of the abdominal wall just above previous ventral hernia repair scar.  She had a recent CT abdomen/pelvis done at [facility], which I reviewed the  report for today.  She did not bring the disc for my personal review of the imaging unfortunately, but has promised that she will mail the disc to our office when she gets home.  She lives in [place], which is 2 hours away, and I advised her that if she cannot mail the disc I will simply have to repeat a CT abdomen/pelvis at a [facility].  The closest [facility] facility to her would be in [place] at [facility].  I asked her to please call me if she has any trouble mailing the disc and we provided a padded envelope for her to send the disc in when she gets home.  I will then call her with the results of the CT once I have been able to personally review the imaging.    Millie Hummel MD  General, Robotic, and Endoscopic Surgery  [facility]    [address], [address]  [place] [address]  P: [phone]  F: [phone]

## 2022-05-17 PROBLEM — N39.3 STRESS INCONTINENCE, FEMALE: Status: ACTIVE | Noted: 2017-03-15

## 2022-05-17 PROBLEM — F32.A DEPRESSION: Status: ACTIVE | Noted: 2020-08-21

## 2022-05-17 PROBLEM — Z87.442 HISTORY OF KIDNEY STONES: Status: ACTIVE | Noted: 2020-08-20

## 2022-05-17 PROBLEM — M54.12 CERVICAL RADICULOPATHY: Status: ACTIVE | Noted: 2020-08-27

## 2022-05-17 PROBLEM — R05.9 COUGH: Status: ACTIVE | Noted: 2020-08-21

## 2022-05-17 PROBLEM — Z72.0 TOBACCO ABUSE: Status: ACTIVE | Noted: 2020-08-21

## 2022-05-17 PROBLEM — M48.02 CERVICAL STENOSIS OF SPINE: Status: ACTIVE | Noted: 2021-10-21

## 2022-05-17 PROBLEM — M47.816 LUMBAR SPONDYLOSIS: Status: ACTIVE | Noted: 2021-10-21

## 2022-05-17 PROBLEM — M96.1 POSTLAMINECTOMY SYNDROME OF CERVICAL REGION: Status: ACTIVE | Noted: 2021-10-21

## 2022-05-17 PROBLEM — E03.9 HYPOTHYROIDISM: Status: ACTIVE | Noted: 2020-08-21

## 2022-05-17 PROBLEM — M51.36 DEGENERATION OF INTERVERTEBRAL DISC OF LUMBAR REGION: Status: ACTIVE | Noted: 2021-10-21

## 2022-05-17 PROBLEM — Z98.1 S/P CERVICAL SPINAL FUSION: Status: ACTIVE | Noted: 2020-10-12

## 2022-05-17 PROBLEM — J31.0 RHINOSINUSITIS: Status: ACTIVE | Noted: 2021-10-29

## 2022-05-17 PROBLEM — J44.9 COPD (CHRONIC OBSTRUCTIVE PULMONARY DISEASE) (HCC): Status: ACTIVE | Noted: 2020-08-20

## 2022-05-17 PROBLEM — M50.90 CERVICAL DISC DISEASE: Status: ACTIVE | Noted: 2021-10-21

## 2022-05-17 PROBLEM — R10.11 RIGHT UPPER QUADRANT ABDOMINAL PAIN: Status: ACTIVE | Noted: 2021-10-21

## 2022-05-17 PROBLEM — J32.9 RHINOSINUSITIS: Status: ACTIVE | Noted: 2021-10-29

## 2022-05-19 ENCOUNTER — TELEPHONE (OUTPATIENT)
Dept: UROLOGY | Facility: CLINIC | Age: 57
End: 2022-05-19

## 2022-05-19 NOTE — TELEPHONE ENCOUNTER
PT CALLED TO [facility]. WILL CALL BACK LATER TO RESCHEDULE APPT PER PT/NOT SURE IF PT HAD CT DONE/ANYTHING ELSE TO DO??

## 2022-05-19 NOTE — TELEPHONE ENCOUNTER
PT CALLED TO CANCEL 1:45P APPOINTMENT WITH DR HUDSON. WILL CALL BACK AT A LATER DATE TO RESCHEDULE.

## 2022-06-03 ENCOUNTER — HOSPITAL ENCOUNTER (EMERGENCY)
Facility: HOSPITAL | Age: 57
Discharge: LEFT BEFORE DISCHARGE | End: 2022-06-03

## 2022-06-03 VITALS
BODY MASS INDEX: 33.74 KG/M2 | HEART RATE: 65 BPM | TEMPERATURE: 97.3 F | WEIGHT: 214.95 LBS | SYSTOLIC BLOOD PRESSURE: 163 MMHG | DIASTOLIC BLOOD PRESSURE: 82 MMHG | RESPIRATION RATE: 18 BRPM | OXYGEN SATURATION: 94 % | HEIGHT: 67 IN

## 2022-06-03 LAB
ALBUMIN SERPL-MCNC: 4.3 G/DL (ref 3.5–5.2)
ALBUMIN/GLOB SERPL: 1.2 G/DL
ALP SERPL-CCNC: 117 U/L (ref 39–117)
ALT SERPL W P-5'-P-CCNC: 29 U/L (ref 1–33)
ANION GAP SERPL CALCULATED.3IONS-SCNC: 14.3 MMOL/L (ref 5–15)
AST SERPL-CCNC: 22 U/L (ref 1–32)
BASOPHILS # BLD AUTO: 0.09 10*3/MM3 (ref 0–0.2)
BASOPHILS NFR BLD AUTO: 0.9 % (ref 0–1.5)
BILIRUB SERPL-MCNC: 0.3 MG/DL (ref 0–1.2)
BUN SERPL-MCNC: 15 MG/DL (ref 6–20)
BUN/CREAT SERPL: 19.5 (ref 7–25)
CALCIUM SPEC-SCNC: 9.5 MG/DL (ref 8.6–10.5)
CHLORIDE SERPL-SCNC: 103 MMOL/L (ref 98–107)
CO2 SERPL-SCNC: 23.7 MMOL/L (ref 22–29)
CREAT SERPL-MCNC: 0.77 MG/DL (ref 0.57–1)
DEPRECATED RDW RBC AUTO: 43.5 FL (ref 37–54)
EGFRCR SERPLBLD CKD-EPI 2021: 89 ML/MIN/1.73
EOSINOPHIL # BLD AUTO: 0.14 10*3/MM3 (ref 0–0.4)
EOSINOPHIL NFR BLD AUTO: 1.3 % (ref 0.3–6.2)
ERYTHROCYTE [DISTWIDTH] IN BLOOD BY AUTOMATED COUNT: 13.4 % (ref 12.3–15.4)
GLOBULIN UR ELPH-MCNC: 3.6 GM/DL
GLUCOSE SERPL-MCNC: 132 MG/DL (ref 65–99)
HCT VFR BLD AUTO: 43.8 % (ref 34–46.6)
HGB BLD-MCNC: 14.6 G/DL (ref 12–15.9)
HOLD SPECIMEN: NORMAL
HOLD SPECIMEN: NORMAL
IMM GRANULOCYTES # BLD AUTO: 0.04 10*3/MM3 (ref 0–0.05)
IMM GRANULOCYTES NFR BLD AUTO: 0.4 % (ref 0–0.5)
LIPASE SERPL-CCNC: 33 U/L (ref 13–60)
LYMPHOCYTES # BLD AUTO: 2.11 10*3/MM3 (ref 0.7–3.1)
LYMPHOCYTES NFR BLD AUTO: 20.2 % (ref 19.6–45.3)
MCH RBC QN AUTO: 29.6 PG (ref 26.6–33)
MCHC RBC AUTO-ENTMCNC: 33.3 G/DL (ref 31.5–35.7)
MCV RBC AUTO: 88.7 FL (ref 79–97)
MONOCYTES # BLD AUTO: 0.61 10*3/MM3 (ref 0.1–0.9)
MONOCYTES NFR BLD AUTO: 5.8 % (ref 5–12)
NEUTROPHILS NFR BLD AUTO: 7.44 10*3/MM3 (ref 1.7–7)
NEUTROPHILS NFR BLD AUTO: 71.4 % (ref 42.7–76)
NRBC BLD AUTO-RTO: 0 /100 WBC (ref 0–0.2)
PLATELET # BLD AUTO: 318 10*3/MM3 (ref 140–450)
PMV BLD AUTO: 10.9 FL (ref 6–12)
POTASSIUM SERPL-SCNC: 3.9 MMOL/L (ref 3.5–5.2)
PROT SERPL-MCNC: 7.9 G/DL (ref 6–8.5)
RBC # BLD AUTO: 4.94 10*6/MM3 (ref 3.77–5.28)
SODIUM SERPL-SCNC: 141 MMOL/L (ref 136–145)
WBC NRBC COR # BLD: 10.43 10*3/MM3 (ref 3.4–10.8)
WHOLE BLOOD HOLD COAG: NORMAL
WHOLE BLOOD HOLD SPECIMEN: NORMAL

## 2022-06-03 PROCEDURE — 36415 COLL VENOUS BLD VENIPUNCTURE: CPT

## 2022-06-03 PROCEDURE — 83690 ASSAY OF LIPASE: CPT

## 2022-06-03 PROCEDURE — 99211 OFF/OP EST MAY X REQ PHY/QHP: CPT

## 2022-06-03 PROCEDURE — 85025 COMPLETE CBC W/AUTO DIFF WBC: CPT

## 2022-06-03 PROCEDURE — 80053 COMPREHEN METABOLIC PANEL: CPT

## 2022-06-03 RX ORDER — KETOROLAC TROMETHAMINE 15 MG/ML
15 INJECTION, SOLUTION INTRAMUSCULAR; INTRAVENOUS ONCE
Status: DISCONTINUED | OUTPATIENT
Start: 2022-06-03 | End: 2022-06-03 | Stop reason: HOSPADM

## 2022-06-03 RX ORDER — ONDANSETRON 2 MG/ML
4 INJECTION INTRAMUSCULAR; INTRAVENOUS ONCE
Status: DISCONTINUED | OUTPATIENT
Start: 2022-06-03 | End: 2022-06-03 | Stop reason: HOSPADM

## 2022-06-03 RX ORDER — SODIUM CHLORIDE 0.9 % (FLUSH) 0.9 %
10 SYRINGE (ML) INJECTION AS NEEDED
Status: DISCONTINUED | OUTPATIENT
Start: 2022-06-03 | End: 2022-06-03 | Stop reason: HOSPADM

## 2022-06-06 ENCOUNTER — TELEPHONE (OUTPATIENT)
Dept: UROLOGY | Facility: CLINIC | Age: 57
End: 2022-06-06

## 2022-06-06 NOTE — TELEPHONE ENCOUNTER
Patient went to [facility]. She had a CT scan and was told she has a 6 mm obstructing stone, and was told to call urologist for surgery.    Pain right flank. No fever, chills, nausea or vomiting. Blood in urine.  No issue passing urine.  No burning with urination.  Placed on Cipro and pain medication.     Patient aware to get disk

## 2022-06-07 ENCOUNTER — OFFICE VISIT (OUTPATIENT)
Dept: UROLOGY | Facility: CLINIC | Age: 57
End: 2022-06-07

## 2022-06-07 VITALS — WEIGHT: 219.4 LBS | BODY MASS INDEX: 34.44 KG/M2 | HEIGHT: 67 IN

## 2022-06-07 DIAGNOSIS — N20.0 NEPHROLITHIASIS: Primary | ICD-10-CM

## 2022-06-07 DIAGNOSIS — N20.1 URETERAL STONE: ICD-10-CM

## 2022-06-07 PROCEDURE — 99214 OFFICE O/P EST MOD 30 MIN: CPT | Performed by: UROLOGY

## 2022-06-07 RX ORDER — CIPROFLOXACIN 500 MG/1
500 TABLET, FILM COATED ORAL 2 TIMES DAILY
Status: ON HOLD | COMMUNITY
Start: 2022-06-04 | End: 2022-07-23

## 2022-06-07 RX ORDER — KETOCONAZOLE 20 MG/ML
SHAMPOO TOPICAL
COMMUNITY
Start: 2022-06-09 | End: 2022-06-24

## 2022-06-07 RX ORDER — KETOROLAC TROMETHAMINE 10 MG/1
10 TABLET, FILM COATED ORAL EVERY 6 HOURS PRN
Status: ON HOLD | COMMUNITY
Start: 2022-06-04 | End: 2022-07-23

## 2022-06-07 NOTE — PROGRESS NOTES
"    UROLOGY OFFICE follow-up NOTE    Subjective   HPI  Tia Silva is a 57 y.o. female.  Presents for follow-up of ureteral stone; history of nephrolithiasis after presentation outside ER, 6/3/2022.  Last seen 4/20/2021.    The patient reports she is doing well.   She states she is not \"screaming in pain\" now, but she is having a little bit of pain.   She reports she had to go to her primary care physician this morning and she was asked if she had passed the stone and she told the provider that she did not know if she did or not because she would not know what to look for.     She states her urine does not have as much blood as it was on Friday and Saturday.   She reports that her primary care provider prescribed her Cipro. She denies any fevers.   Not currently requiring pain medication.    History of nephrolithiasis, has passed and required intervention of stones      __________  Labs 6/3/2022: ([place])  WBC 13.4  Creatinine: 0.7  UA: Large blood; nitrite positive; negative leukocytes; 1+ bacteria    CT abdomen pelvis without contrast, 6/3/2022: 6 mm obstructing stone in the proximal right ureter with associated moderate hydronephrosis; several other nonobstructing stones are seen bilaterally      Results for orders placed or performed during the hospital encounter of 10/19/21   Tissue Pathology Exam    Specimen: Abdominal Wall; Tissue   Result Value Ref Range    Case Report       Surgical Pathology Report                         Case: [id number]                                  Authorizing Provider:  Millie Hummel MD     Collected:           10/19/2021 02:10 PM          Ordering Location:     [facility]  Received:            10/19/2021 03:09 PM                                 [facility] OR                                                                       Pathologist:           Lucita Asencio MD                                                          Specimen:    Abdominal Wall, adominal " "wall mesh                                                         Final Diagnosis       1. Abdominal Wall Soft Tissue and Mesh, Excision:   A. Grossly identified mesh with dense adherent fibroadipose tissue and cicatrix.   B. No significant inflammation nor evidence of malignancy.    [facility]/jse       Gross Description       1. Received in formalin labeled \"abdominal wall mesh\" is a 6.5 x 4.0 x 3.5 cm irregular portion of blue plastic synthetic mesh with adherent fibrofatty tissue.  A portion of the tissue is scraped away from the mesh and submitted as 1A.     jap/uso/jat/brb           Medical History:  Past Medical History:   Diagnosis Date   • Arthritis    • Asthma    • Back pain    • Breast abscess 06/17/2014   • Cancer (HCC)     BREAST   • Cholecystitis, chronic    • Cholelithiasis    • COPD (chronic obstructive pulmonary disease) ([facility])    • Cystitis 08/03/2016   • Hemiplegia ([facility])     RIGHT SIDED WEAKNESS   • History of bladder infections    • History of bronchitis    • History of pneumonia    • Kidney stones    • Left nephrolithiasis 05/12/2016   • Pain management    • Pulmonary embolism ([facility])    • Rectocele    • Right nephrolithiasis 05/12/2016   • Seizure (HCC) 2011   • Stress incontinence    • Stroke (cerebrum) ([facility])    • Thyroid disease     NODULES        Social History:  Social History     Socioeconomic History   • Marital status:    Tobacco Use   • Smoking status: Current Every Day Smoker     Packs/day: 1.00     Years: 25.00     Pack years: 25.00     Types: Cigarettes   • Smokeless tobacco: Never Used   Vaping Use   • Vaping Use: Never used   Substance and Sexual Activity   • Alcohol use: Never   • Drug use: Never   • Sexual activity: Defer        Family History:  Family History   Problem Relation Age of Onset   • Kidney cancer Brother    • Malig Hyperthermia Neg Hx         Surgical History:  Past Surgical History:   Procedure Laterality Date   • BACK SURGERY  2020    [facility]   • BLADDER " SURGERY      LIFT   • CHOLECYSTECTOMY N/A 2014    Dr. Adriano Tubbs   • COLONOSCOPY N/A 2015    [place], [place]   • CYSTOSCOPY BLADDER BIOPSY     • CYSTOSCOPY URETEROSCOPY LASER LITHOTRIPSY     • HYSTERECTOMY  2000    [place], [place]   • INCISION AND DRAINAGE BREAST ABSCESS Left    • INCISIONAL HERNIA REPAIR N/A 02/28/2017    Epigastric Incisional Hernia repair w/ mesh (C-Qur V-patch 8 cm mesh placed under fascia), Dr. Javier Oh, [facility]   • INGUINAL HERNIA REPAIR N/A 10/19/2021    Procedure: Abdominal wall exploration with mesh removal and ventral hernia repair;  Surgeon: Millie Hummel MD;  [place] [facility];  Service: General;  Laterality: N/A;   • KIDNEY STONE SURGERY     • LUMBAR EPIDURAL INJECTION      Steriod injections   • TUBAL ABDOMINAL LIGATION          Allergies:  Allergies   Allergen Reactions   • Bupropion Other (See Comments)     VOMITING, DELERIUM        Current Medications:  Current Outpatient Medications   Medication Sig Dispense Refill   • albuterol sulfate  (90 Base) MCG/ACT inhaler Ventolin HFA 90 mcg/actuation inhalation HFA aerosol inhaler inhale 1 puff (90 mcg) by inhalation route every 6 hours as needed   Active     • Anoro Ellipta 62.5-25 MCG/INH aerosol powder  inhaler Inhale 1 puff Daily.     • baclofen (LIORESAL) 10 MG tablet 3 (Three) Times a Day As Needed for Muscle Spasms.     • Cholecalciferol 125 MCG (5000 UT) tablet Take 5,000 Units by mouth Daily.     • docusate sodium 100 MG capsule Take 100 mg by mouth As Needed.     • Fluticasone-Umeclidin-Vilant (Trelegy Ellipta) 200-62.5-25 MCG/INH inhaler Inhale 1 puff Daily.     • gabapentin (NEURONTIN) 100 MG capsule Take 100 mg by mouth 2 (Two) Times a Day.     • gabapentin (Neurontin) 300 MG capsule Take 1 capsule by mouth 3 (Three) Times a Day. 90 capsule 2   • HYDROcodone-acetaminophen (NORCO) 5-325 MG per tablet TAKE 1 TABLET BY MOUTH ONCE DAILY AS NEEDED FOR PAIN     • levothyroxine  "(SYNTHROID, LEVOTHROID) 112 MCG tablet Take 112 mcg by mouth Daily.     • magnesium oxide (MAG-OX) 400 MG tablet Take 400 mg by mouth Daily As Needed.     • mometasone-formoterol (Dulera) 100-5 MCG/ACT inhaler Daily As Needed.     • PARoxetine (PAXIL) 40 MG tablet Take 40 mg by mouth Every Morning.     • potassium chloride 10 MEQ CR tablet Take 10 mEq by mouth Daily.     • ciprofloxacin (CIPRO) 500 MG tablet Take 500 mg by mouth 2 (Two) Times a Day. for 10 days     • [START ON 6/9/2022] ketoconazole (NIZORAL) 2 % shampoo Apply  topically to the appropriate area as directed.     • ketorolac (TORADOL) 10 MG tablet Take 10 mg by mouth Every 6 (Six) Hours As Needed. for pain       No current facility-administered medications for this visit.       Review of systems  A review of systems was performed, and positive findings are noted in the HPI.    Objective     Vital Signs:   Ht 170.2 cm (67\")   Wt 99.5 kg (219 lb 6.4 oz)   BMI 34.36 kg/m²       Physical exam  No acute distress, well-nourished  Awake alert and oriented  Mood normal; affect normal    Problem List:  Patient Active Problem List   Diagnosis   • Abscess   • Back pain   • Calculus of kidney   • Cholecystitis, chronic   • Cholelithiasis   • Cystitis   • Hemiplegia (HCC)   • Seizures (HCC)   • Abdominal wall mass of right upper quadrant   • Status post hernia repair   • Cervical disc disease   • Cervical radiculopathy   • Cervical stenosis of spine   • COPD (chronic obstructive pulmonary disease) (HCC)   • Cough   • Depression   • Degeneration of intervertebral disc of lumbar region   • History of kidney stones   • Hypothyroidism   • Lumbar spondylosis   • Postlaminectomy syndrome of cervical region   • Rhinosinusitis   • Right upper quadrant abdominal pain   • Stress incontinence, female   • Tobacco abuse   • S/P cervical spinal fusion       Assessment & Plan   Diagnoses and all orders for this visit:    1. Nephrolithiasis (Primary)    2. Ureteral stone  -     " XR Abdomen KUB; Future      Potentially passed the right proximal 6 mm stone   -     The patient needs follow up imaging due to the size, and degree of hydronephrosis to verify the stone has passed.  -   Plan for follow-up with KUB prior about a month  -     If the stone has not passed within 4 to 6 weeks, we will make a plan to take it out.  -     If she gets a recurrence of pain, a fever over 101, or persistent vomiting, she should call the office, and we will schedule to have the stone removed.   -     Continue to stay well hydrated.   -Medications as needed      Patient encouraged to follow-up in 1 month.  KUB prior  All questions and concerns have been addressed at this time.      MDM moderate: 1 chronic illness with exacerbation, progression; independent interpretation of test performed by other professional; i.e. actual CT scan imaging from [place], 6/3/2022 reviewed with patient  provided independent interpretation and management recommendation    Transcribed from ambient dictation for Corie Edgar MD by Lane Jacinto Rep.  06/07/22   16:24 EDT    Patient verbalized consent to the visit recording.  I have personally performed the services described in this document as transcribed by the above individual, and it is both accurate and complete.  Corie Edgar MD  6/8/2022  13:17 EDT

## 2022-07-08 ENCOUNTER — TELEPHONE (OUTPATIENT)
Dept: UROLOGY | Facility: CLINIC | Age: 57
End: 2022-07-08

## 2022-07-08 NOTE — TELEPHONE ENCOUNTER
CALLED PATIENT TO REMIND HER TO GET HER XRAY (KUB) DONE PRIOR TO HER 7/14/22 APPT. PATIENT EXPRESSED UNDERSTANDING.

## 2022-07-11 PROBLEM — L21.0 DANDRUFF IN ADULT: Status: ACTIVE | Noted: 2022-06-07

## 2022-07-13 ENCOUNTER — TELEPHONE (OUTPATIENT)
Dept: UROLOGY | Facility: CLINIC | Age: 57
End: 2022-07-13

## 2022-07-22 ENCOUNTER — ANESTHESIA EVENT (OUTPATIENT)
Dept: PERIOP | Facility: HOSPITAL | Age: 57
End: 2022-07-22

## 2022-07-22 ENCOUNTER — APPOINTMENT (OUTPATIENT)
Dept: GENERAL RADIOLOGY | Facility: HOSPITAL | Age: 57
End: 2022-07-22

## 2022-07-22 ENCOUNTER — HOSPITAL ENCOUNTER (INPATIENT)
Facility: HOSPITAL | Age: 57
LOS: 3 days | Discharge: HOME | End: 2022-07-25
Attending: INTERNAL MEDICINE | Admitting: INTERNAL MEDICINE

## 2022-07-22 ENCOUNTER — ANESTHESIA (OUTPATIENT)
Dept: PERIOP | Facility: HOSPITAL | Age: 57
End: 2022-07-22

## 2022-07-22 ENCOUNTER — PREP FOR SURGERY (OUTPATIENT)
Dept: OTHER | Facility: HOSPITAL | Age: 57
End: 2022-07-22

## 2022-07-22 DIAGNOSIS — M50.90 CERVICAL DISC DISEASE: ICD-10-CM

## 2022-07-22 DIAGNOSIS — N10 ACUTE PYELONEPHRITIS: ICD-10-CM

## 2022-07-22 DIAGNOSIS — N20.1 CALCULUS OF RIGHT URETER: Primary | ICD-10-CM

## 2022-07-22 PROBLEM — N39.0 SEPSIS DUE TO URINARY TRACT INFECTION (HCC): Status: ACTIVE | Noted: 2022-07-22

## 2022-07-22 PROBLEM — A41.9 SEPSIS DUE TO URINARY TRACT INFECTION (HCC): Status: ACTIVE | Noted: 2022-07-22

## 2022-07-22 LAB
ANION GAP SERPL CALCULATED.3IONS-SCNC: 8.2 MMOL/L (ref 5–15)
ARTERIAL PATENCY WRIST A: ABNORMAL
BASE EXCESS BLDA CALC-SCNC: -7.3 MMOL/L (ref -2–2)
BASOPHILS # BLD AUTO: 0.07 10*3/MM3 (ref 0–0.2)
BASOPHILS NFR BLD AUTO: 0.4 % (ref 0–1.5)
BDY SITE: ABNORMAL
BUN SERPL-MCNC: 18 MG/DL (ref 6–20)
BUN/CREAT SERPL: 20.9 (ref 7–25)
CA-I BLDA-SCNC: 1.05 MMOL/L (ref 1.13–1.32)
CALCIUM SPEC-SCNC: 8.1 MG/DL (ref 8.6–10.5)
CHLORIDE BLDA-SCNC: 106 MMOL/L (ref 98–106)
CHLORIDE SERPL-SCNC: 103 MMOL/L (ref 98–107)
CO2 SERPL-SCNC: 23.8 MMOL/L (ref 22–29)
COHGB MFR BLD: 0.3 % (ref 0–1.5)
CREAT SERPL-MCNC: 0.86 MG/DL (ref 0.57–1)
DEPRECATED RDW RBC AUTO: 44.8 FL (ref 37–54)
EGFRCR SERPLBLD CKD-EPI 2021: 77.9 ML/MIN/1.73
EOSINOPHIL # BLD AUTO: 0 10*3/MM3 (ref 0–0.4)
EOSINOPHIL NFR BLD AUTO: 0 % (ref 0.3–6.2)
ERYTHROCYTE [DISTWIDTH] IN BLOOD BY AUTOMATED COUNT: 13.9 % (ref 12.3–15.4)
FHHB: 1.2 % (ref 0–5)
GAS FLOW AIRWAY: ABNORMAL L/MIN
GLUCOSE BLDA-MCNC: 141 MMOL/L (ref 65–99)
GLUCOSE SERPL-MCNC: 133 MG/DL (ref 65–99)
HCO3 BLDA-SCNC: 22.7 MMOL/L (ref 22–26)
HCT VFR BLD AUTO: 37.7 % (ref 34–46.6)
HGB BLD-MCNC: 12.7 G/DL (ref 12–15.9)
HGB BLDA-MCNC: 13.1 G/DL (ref 11.7–14.6)
IMM GRANULOCYTES # BLD AUTO: 0.07 10*3/MM3 (ref 0–0.05)
IMM GRANULOCYTES NFR BLD AUTO: 0.4 % (ref 0–0.5)
INHALED O2 CONCENTRATION: 100 %
LACTATE BLDA-SCNC: 0.64 MMOL/L (ref 0.5–2)
LYMPHOCYTES # BLD AUTO: 1.15 10*3/MM3 (ref 0.7–3.1)
LYMPHOCYTES NFR BLD AUTO: 7.3 % (ref 19.6–45.3)
MAGNESIUM SERPL-MCNC: 1.6 MG/DL (ref 1.6–2.6)
MCH RBC QN AUTO: 30 PG (ref 26.6–33)
MCHC RBC AUTO-ENTMCNC: 33.7 G/DL (ref 31.5–35.7)
MCV RBC AUTO: 88.9 FL (ref 79–97)
METHGB BLD QL: 0.5 % (ref 0–1.5)
MODALITY: ABNORMAL
MONOCYTES # BLD AUTO: 1.24 10*3/MM3 (ref 0.1–0.9)
MONOCYTES NFR BLD AUTO: 7.8 % (ref 5–12)
NEUTROPHILS NFR BLD AUTO: 13.28 10*3/MM3 (ref 1.7–7)
NEUTROPHILS NFR BLD AUTO: 84.1 % (ref 42.7–76)
NOTE: ABNORMAL
NRBC BLD AUTO-RTO: 0 /100 WBC (ref 0–0.2)
OXYHGB MFR BLDV: 98 % (ref 94–99)
PCO2 BLDA: 67.9 MM HG (ref 35–45)
PH BLDA: 7.14 PH UNITS (ref 7.35–7.45)
PLATELET # BLD AUTO: 202 10*3/MM3 (ref 140–450)
PMV BLD AUTO: 10.6 FL (ref 6–12)
PO2 BLD: 317 MM[HG] (ref 0–500)
PO2 BLDA: 316.9 MM HG (ref 80–100)
POTASSIUM BLDA-SCNC: 3.9 MMOL/L (ref 3.5–5)
POTASSIUM SERPL-SCNC: 4 MMOL/L (ref 3.5–5.2)
RBC # BLD AUTO: 4.24 10*6/MM3 (ref 3.77–5.28)
SAO2 % BLDCOA: 98.8 % (ref 95–99)
SODIUM BLDA-SCNC: 135 MMOL/L (ref 136–146)
SODIUM SERPL-SCNC: 135 MMOL/L (ref 136–145)
WBC NRBC COR # BLD: 15.81 10*3/MM3 (ref 3.4–10.8)

## 2022-07-22 PROCEDURE — C1769 GUIDE WIRE: HCPCS | Performed by: UROLOGY

## 2022-07-22 PROCEDURE — 74420 UROGRAPHY RTRGR +-KUB: CPT

## 2022-07-22 PROCEDURE — 25010000002 MIDAZOLAM PER 1 MG: Performed by: STUDENT IN AN ORGANIZED HEALTH CARE EDUCATION/TRAINING PROGRAM

## 2022-07-22 PROCEDURE — 52332 CYSTOSCOPY AND TREATMENT: CPT | Performed by: UROLOGY

## 2022-07-22 PROCEDURE — 25010000002 FENTANYL CITRATE (PF) 50 MCG/ML SOLUTION: Performed by: NURSE ANESTHETIST, CERTIFIED REGISTERED

## 2022-07-22 PROCEDURE — 25010000002 PROPOFOL 10 MG/ML EMULSION: Performed by: NURSE ANESTHETIST, CERTIFIED REGISTERED

## 2022-07-22 PROCEDURE — 25010000002 DEXAMETHASONE PER 1 MG: Performed by: NURSE ANESTHETIST, CERTIFIED REGISTERED

## 2022-07-22 PROCEDURE — 82805 BLOOD GASES W/O2 SATURATION: CPT | Performed by: UROLOGY

## 2022-07-22 PROCEDURE — 85025 COMPLETE CBC W/AUTO DIFF WBC: CPT | Performed by: HOSPITALIST

## 2022-07-22 PROCEDURE — 82375 ASSAY CARBOXYHB QUANT: CPT | Performed by: UROLOGY

## 2022-07-22 PROCEDURE — 74018 RADEX ABDOMEN 1 VIEW: CPT

## 2022-07-22 PROCEDURE — 25010000002 MAGNESIUM SULFATE IN D5W 1G/100ML (PREMIX) 1-5 GM/100ML-% SOLUTION: Performed by: HOSPITALIST

## 2022-07-22 PROCEDURE — 99223 1ST HOSP IP/OBS HIGH 75: CPT | Performed by: HOSPITALIST

## 2022-07-22 PROCEDURE — 25010000002 PIPERACILLIN SOD-TAZOBACTAM PER 1 G: Performed by: HOSPITALIST

## 2022-07-22 PROCEDURE — 94799 UNLISTED PULMONARY SVC/PX: CPT

## 2022-07-22 PROCEDURE — 25010000002 ONDANSETRON PER 1 MG: Performed by: NURSE ANESTHETIST, CERTIFIED REGISTERED

## 2022-07-22 PROCEDURE — 25010000002 PROPOFOL 10 MG/ML EMULSION: Performed by: HOSPITALIST

## 2022-07-22 PROCEDURE — C2617 STENT, NON-COR, TEM W/O DEL: HCPCS | Performed by: UROLOGY

## 2022-07-22 PROCEDURE — 94002 VENT MGMT INPAT INIT DAY: CPT

## 2022-07-22 PROCEDURE — 25010000002 CALCIUM GLUCONATE-NACL 1-0.675 GM/50ML-% SOLUTION: Performed by: HOSPITALIST

## 2022-07-22 PROCEDURE — 0T768DZ DILATION OF RIGHT URETER WITH INTRALUMINAL DEVICE, VIA NATURAL OR ARTIFICIAL OPENING ENDOSCOPIC: ICD-10-PCS | Performed by: UROLOGY

## 2022-07-22 PROCEDURE — 80048 BASIC METABOLIC PNL TOTAL CA: CPT | Performed by: HOSPITALIST

## 2022-07-22 PROCEDURE — 25010000002 VANCOMYCIN 5 G RECONSTITUTED SOLUTION: Performed by: HOSPITALIST

## 2022-07-22 PROCEDURE — C1758 CATHETER, URETERAL: HCPCS | Performed by: UROLOGY

## 2022-07-22 PROCEDURE — BT1D1ZZ FLUOROSCOPY OF RIGHT KIDNEY, URETER AND BLADDER USING LOW OSMOLAR CONTRAST: ICD-10-PCS | Performed by: UROLOGY

## 2022-07-22 PROCEDURE — 83050 HGB METHEMOGLOBIN QUAN: CPT | Performed by: UROLOGY

## 2022-07-22 PROCEDURE — 25010000002 METOCLOPRAMIDE PER 10 MG: Performed by: STUDENT IN AN ORGANIZED HEALTH CARE EDUCATION/TRAINING PROGRAM

## 2022-07-22 PROCEDURE — 71045 X-RAY EXAM CHEST 1 VIEW: CPT

## 2022-07-22 PROCEDURE — 83735 ASSAY OF MAGNESIUM: CPT | Performed by: HOSPITALIST

## 2022-07-22 PROCEDURE — 99221 1ST HOSP IP/OBS SF/LOW 40: CPT | Performed by: UROLOGY

## 2022-07-22 PROCEDURE — 25010000002 MIDAZOLAM PER 1 MG: Performed by: NURSE ANESTHETIST, CERTIFIED REGISTERED

## 2022-07-22 DEVICE — STNT URETRL CLASSC DBL PIG 6F 26CM
Type: IMPLANTABLE DEVICE | Site: URETER | Status: NON-FUNCTIONAL
Removed: 2022-08-08

## 2022-07-22 RX ORDER — NALOXONE HCL 0.4 MG/ML
0.4 VIAL (ML) INJECTION
Status: DISCONTINUED | OUTPATIENT
Start: 2022-07-22 | End: 2022-07-23

## 2022-07-22 RX ORDER — HYDROCODONE BITARTRATE AND ACETAMINOPHEN 5; 325 MG/1; MG/1
1 TABLET ORAL EVERY 4 HOURS PRN
Status: DISCONTINUED | OUTPATIENT
Start: 2022-07-22 | End: 2022-07-24 | Stop reason: SDUPTHER

## 2022-07-22 RX ORDER — GABAPENTIN 100 MG/1
100 CAPSULE ORAL EVERY 12 HOURS SCHEDULED
Status: DISCONTINUED | OUTPATIENT
Start: 2022-07-22 | End: 2022-07-25 | Stop reason: HOSPADM

## 2022-07-22 RX ORDER — NICOTINE POLACRILEX 4 MG
24 LOZENGE BUCCAL
Status: DISCONTINUED | OUTPATIENT
Start: 2022-07-22 | End: 2022-07-25 | Stop reason: HOSPADM

## 2022-07-22 RX ORDER — ACETAMINOPHEN 650 MG/1
650 SUPPOSITORY RECTAL EVERY 4 HOURS PRN
Status: DISCONTINUED | OUTPATIENT
Start: 2022-07-22 | End: 2022-07-25 | Stop reason: HOSPADM

## 2022-07-22 RX ORDER — MAGNESIUM SULFATE 1 G/100ML
1 INJECTION INTRAVENOUS
Status: COMPLETED | OUTPATIENT
Start: 2022-07-22 | End: 2022-07-23

## 2022-07-22 RX ORDER — AMOXICILLIN 250 MG
2 CAPSULE ORAL 2 TIMES DAILY
Status: DISCONTINUED | OUTPATIENT
Start: 2022-07-22 | End: 2022-07-25 | Stop reason: HOSPADM

## 2022-07-22 RX ORDER — FENTANYL CITRATE 50 UG/ML
INJECTION, SOLUTION INTRAMUSCULAR; INTRAVENOUS AS NEEDED
Status: DISCONTINUED | OUTPATIENT
Start: 2022-07-22 | End: 2022-07-22 | Stop reason: SURG

## 2022-07-22 RX ORDER — CHOLECALCIFEROL (VITAMIN D3) 125 MCG
5 CAPSULE ORAL NIGHTLY PRN
Status: DISCONTINUED | OUTPATIENT
Start: 2022-07-22 | End: 2022-07-25 | Stop reason: HOSPADM

## 2022-07-22 RX ORDER — SODIUM CHLORIDE 9 MG/ML
100 INJECTION, SOLUTION INTRAVENOUS CONTINUOUS
Status: DISCONTINUED | OUTPATIENT
Start: 2022-07-22 | End: 2022-07-23

## 2022-07-22 RX ORDER — NALOXONE HCL 0.4 MG/ML
0.4 VIAL (ML) INJECTION
Status: DISCONTINUED | OUTPATIENT
Start: 2022-07-22 | End: 2022-07-24

## 2022-07-22 RX ORDER — PROMETHAZINE HYDROCHLORIDE 25 MG/1
25 TABLET ORAL ONCE AS NEEDED
Status: CANCELLED | OUTPATIENT
Start: 2022-07-22

## 2022-07-22 RX ORDER — MIDAZOLAM HYDROCHLORIDE 1 MG/ML
INJECTION INTRAMUSCULAR; INTRAVENOUS AS NEEDED
Status: DISCONTINUED | OUTPATIENT
Start: 2022-07-22 | End: 2022-07-22 | Stop reason: SURG

## 2022-07-22 RX ORDER — DEXTROSE MONOHYDRATE 25 G/50ML
25 INJECTION, SOLUTION INTRAVENOUS
Status: DISCONTINUED | OUTPATIENT
Start: 2022-07-22 | End: 2022-07-25 | Stop reason: HOSPADM

## 2022-07-22 RX ORDER — LIDOCAINE HYDROCHLORIDE 20 MG/ML
INJECTION, SOLUTION EPIDURAL; INFILTRATION; INTRACAUDAL; PERINEURAL AS NEEDED
Status: DISCONTINUED | OUTPATIENT
Start: 2022-07-22 | End: 2022-07-22 | Stop reason: SURG

## 2022-07-22 RX ORDER — ACETAMINOPHEN 160 MG/5ML
650 SOLUTION ORAL EVERY 4 HOURS PRN
Status: DISCONTINUED | OUTPATIENT
Start: 2022-07-22 | End: 2022-07-25 | Stop reason: HOSPADM

## 2022-07-22 RX ORDER — MIDAZOLAM HYDROCHLORIDE 1 MG/ML
2 INJECTION INTRAMUSCULAR; INTRAVENOUS
Status: COMPLETED | OUTPATIENT
Start: 2022-07-22 | End: 2022-07-22

## 2022-07-22 RX ORDER — POLYETHYLENE GLYCOL 3350 17 G/17G
17 POWDER, FOR SOLUTION ORAL DAILY PRN
Status: DISCONTINUED | OUTPATIENT
Start: 2022-07-22 | End: 2022-07-25 | Stop reason: HOSPADM

## 2022-07-22 RX ORDER — MORPHINE SULFATE 2 MG/ML
1 INJECTION, SOLUTION INTRAMUSCULAR; INTRAVENOUS EVERY 4 HOURS PRN
Status: DISCONTINUED | OUTPATIENT
Start: 2022-07-22 | End: 2022-07-24

## 2022-07-22 RX ORDER — BUDESONIDE 0.5 MG/2ML
0.5 INHALANT ORAL
Status: DISCONTINUED | OUTPATIENT
Start: 2022-07-22 | End: 2022-07-25 | Stop reason: HOSPADM

## 2022-07-22 RX ORDER — PROMETHAZINE HYDROCHLORIDE 25 MG/1
25 SUPPOSITORY RECTAL ONCE AS NEEDED
Status: CANCELLED | OUTPATIENT
Start: 2022-07-22

## 2022-07-22 RX ORDER — ALUMINA, MAGNESIA, AND SIMETHICONE 2400; 2400; 240 MG/30ML; MG/30ML; MG/30ML
15 SUSPENSION ORAL EVERY 6 HOURS PRN
Status: DISCONTINUED | OUTPATIENT
Start: 2022-07-22 | End: 2022-07-25 | Stop reason: HOSPADM

## 2022-07-22 RX ORDER — PROPOFOL 10 MG/ML
VIAL (ML) INTRAVENOUS AS NEEDED
Status: DISCONTINUED | OUTPATIENT
Start: 2022-07-22 | End: 2022-07-22 | Stop reason: SURG

## 2022-07-22 RX ORDER — PAROXETINE HYDROCHLORIDE 20 MG/1
40 TABLET, FILM COATED ORAL DAILY
Status: DISCONTINUED | OUTPATIENT
Start: 2022-07-22 | End: 2022-07-25 | Stop reason: HOSPADM

## 2022-07-22 RX ORDER — SODIUM CHLORIDE 0.9 % (FLUSH) 0.9 %
10 SYRINGE (ML) INJECTION EVERY 12 HOURS SCHEDULED
Status: DISCONTINUED | OUTPATIENT
Start: 2022-07-22 | End: 2022-07-25 | Stop reason: HOSPADM

## 2022-07-22 RX ORDER — IPRATROPIUM BROMIDE AND ALBUTEROL SULFATE 2.5; .5 MG/3ML; MG/3ML
3 SOLUTION RESPIRATORY (INHALATION)
Status: DISCONTINUED | OUTPATIENT
Start: 2022-07-22 | End: 2022-07-23

## 2022-07-22 RX ORDER — DEXAMETHASONE SODIUM PHOSPHATE 4 MG/ML
INJECTION, SOLUTION INTRA-ARTICULAR; INTRALESIONAL; INTRAMUSCULAR; INTRAVENOUS; SOFT TISSUE AS NEEDED
Status: DISCONTINUED | OUTPATIENT
Start: 2022-07-22 | End: 2022-07-22 | Stop reason: SURG

## 2022-07-22 RX ORDER — BUSPIRONE HYDROCHLORIDE 15 MG/1
15 TABLET ORAL EVERY 12 HOURS SCHEDULED
Status: DISCONTINUED | OUTPATIENT
Start: 2022-07-22 | End: 2022-07-22

## 2022-07-22 RX ORDER — SUCCINYLCHOLINE/SOD CL,ISO/PF 100 MG/5ML
SYRINGE (ML) INTRAVENOUS AS NEEDED
Status: DISCONTINUED | OUTPATIENT
Start: 2022-07-22 | End: 2022-07-22 | Stop reason: SURG

## 2022-07-22 RX ORDER — ONDANSETRON 2 MG/ML
4 INJECTION INTRAMUSCULAR; INTRAVENOUS ONCE AS NEEDED
Status: CANCELLED | OUTPATIENT
Start: 2022-07-22

## 2022-07-22 RX ORDER — CALCIUM GLUCONATE 20 MG/ML
1 INJECTION, SOLUTION INTRAVENOUS ONCE
Status: COMPLETED | OUTPATIENT
Start: 2022-07-22 | End: 2022-07-22

## 2022-07-22 RX ORDER — LEVOTHYROXINE SODIUM 112 UG/1
112 TABLET ORAL
Status: DISCONTINUED | OUTPATIENT
Start: 2022-07-23 | End: 2022-07-25 | Stop reason: HOSPADM

## 2022-07-22 RX ORDER — CEFTRIAXONE SODIUM 1 G/50ML
1 INJECTION, SOLUTION INTRAVENOUS EVERY 24 HOURS
Status: DISCONTINUED | OUTPATIENT
Start: 2022-07-23 | End: 2022-07-22 | Stop reason: SDUPTHER

## 2022-07-22 RX ORDER — FAMOTIDINE 10 MG/ML
10 INJECTION, SOLUTION INTRAVENOUS ONCE
Status: COMPLETED | OUTPATIENT
Start: 2022-07-22 | End: 2022-07-22

## 2022-07-22 RX ORDER — SODIUM CHLORIDE 9 MG/ML
40 INJECTION, SOLUTION INTRAVENOUS AS NEEDED
Status: DISCONTINUED | OUTPATIENT
Start: 2022-07-22 | End: 2022-07-25 | Stop reason: HOSPADM

## 2022-07-22 RX ORDER — QUETIAPINE FUMARATE 25 MG/1
25 TABLET, FILM COATED ORAL NIGHTLY
Status: DISCONTINUED | OUTPATIENT
Start: 2022-07-22 | End: 2022-07-22

## 2022-07-22 RX ORDER — OXYCODONE HYDROCHLORIDE 5 MG/1
5 TABLET ORAL
Status: CANCELLED | OUTPATIENT
Start: 2022-07-22

## 2022-07-22 RX ORDER — SODIUM CHLORIDE 0.9 % (FLUSH) 0.9 %
10 SYRINGE (ML) INJECTION AS NEEDED
Status: DISCONTINUED | OUTPATIENT
Start: 2022-07-22 | End: 2022-07-25 | Stop reason: HOSPADM

## 2022-07-22 RX ORDER — ALBUTEROL SULFATE 2.5 MG/3ML
SOLUTION RESPIRATORY (INHALATION) AS NEEDED
Status: DISCONTINUED | OUTPATIENT
Start: 2022-07-22 | End: 2022-07-22 | Stop reason: SURG

## 2022-07-22 RX ORDER — NITROGLYCERIN 0.4 MG/1
0.4 TABLET SUBLINGUAL
Status: DISCONTINUED | OUTPATIENT
Start: 2022-07-22 | End: 2022-07-23

## 2022-07-22 RX ORDER — NOREPINEPHRINE BIT/0.9 % NACL 8 MG/250ML
.02-.3 INFUSION BOTTLE (ML) INTRAVENOUS
Status: DISCONTINUED | OUTPATIENT
Start: 2022-07-22 | End: 2022-07-23

## 2022-07-22 RX ORDER — GABAPENTIN 300 MG/1
300 CAPSULE ORAL EVERY 8 HOURS SCHEDULED
Status: DISCONTINUED | OUTPATIENT
Start: 2022-07-22 | End: 2022-07-22

## 2022-07-22 RX ORDER — HYDROCODONE BITARTRATE AND ACETAMINOPHEN 7.5; 325 MG/1; MG/1
2 TABLET ORAL EVERY 4 HOURS PRN
Status: DISCONTINUED | OUTPATIENT
Start: 2022-07-22 | End: 2022-07-23

## 2022-07-22 RX ORDER — ARFORMOTEROL TARTRATE 15 UG/2ML
15 SOLUTION RESPIRATORY (INHALATION)
Status: DISCONTINUED | OUTPATIENT
Start: 2022-07-22 | End: 2022-07-25 | Stop reason: HOSPADM

## 2022-07-22 RX ORDER — PHENYLEPHRINE HCL IN 0.9% NACL 1 MG/10 ML
SYRINGE (ML) INTRAVENOUS AS NEEDED
Status: DISCONTINUED | OUTPATIENT
Start: 2022-07-22 | End: 2022-07-22 | Stop reason: SURG

## 2022-07-22 RX ORDER — ONDANSETRON 2 MG/ML
INJECTION INTRAMUSCULAR; INTRAVENOUS AS NEEDED
Status: DISCONTINUED | OUTPATIENT
Start: 2022-07-22 | End: 2022-07-22 | Stop reason: SURG

## 2022-07-22 RX ORDER — GABAPENTIN 100 MG/1
100 CAPSULE ORAL EVERY 8 HOURS SCHEDULED
Status: DISCONTINUED | OUTPATIENT
Start: 2022-07-22 | End: 2022-07-22

## 2022-07-22 RX ORDER — ONDANSETRON 4 MG/1
4 TABLET, FILM COATED ORAL EVERY 6 HOURS PRN
Status: DISCONTINUED | OUTPATIENT
Start: 2022-07-22 | End: 2022-07-25 | Stop reason: HOSPADM

## 2022-07-22 RX ORDER — BISACODYL 10 MG
10 SUPPOSITORY, RECTAL RECTAL DAILY PRN
Status: DISCONTINUED | OUTPATIENT
Start: 2022-07-22 | End: 2022-07-25 | Stop reason: HOSPADM

## 2022-07-22 RX ORDER — MEPERIDINE HYDROCHLORIDE 25 MG/ML
12.5 INJECTION INTRAMUSCULAR; INTRAVENOUS; SUBCUTANEOUS
Status: CANCELLED | OUTPATIENT
Start: 2022-07-22 | End: 2022-07-23

## 2022-07-22 RX ORDER — SODIUM CHLORIDE, SODIUM LACTATE, POTASSIUM CHLORIDE, CALCIUM CHLORIDE 600; 310; 30; 20 MG/100ML; MG/100ML; MG/100ML; MG/100ML
9 INJECTION, SOLUTION INTRAVENOUS CONTINUOUS PRN
Status: DISCONTINUED | OUTPATIENT
Start: 2022-07-22 | End: 2022-07-23

## 2022-07-22 RX ORDER — NICOTINE 21 MG/24HR
1 PATCH, TRANSDERMAL 24 HOURS TRANSDERMAL
Status: DISCONTINUED | OUTPATIENT
Start: 2022-07-22 | End: 2022-07-25 | Stop reason: HOSPADM

## 2022-07-22 RX ORDER — BISACODYL 5 MG/1
5 TABLET, DELAYED RELEASE ORAL DAILY PRN
Status: DISCONTINUED | OUTPATIENT
Start: 2022-07-22 | End: 2022-07-25 | Stop reason: HOSPADM

## 2022-07-22 RX ORDER — INSULIN LISPRO 100 [IU]/ML
0-9 INJECTION, SOLUTION INTRAVENOUS; SUBCUTANEOUS
Status: DISCONTINUED | OUTPATIENT
Start: 2022-07-23 | End: 2022-07-25 | Stop reason: HOSPADM

## 2022-07-22 RX ORDER — CEFTRIAXONE SODIUM 1 G/50ML
1 INJECTION, SOLUTION INTRAVENOUS EVERY 24 HOURS
Status: DISCONTINUED | OUTPATIENT
Start: 2022-07-22 | End: 2022-07-22

## 2022-07-22 RX ORDER — METOCLOPRAMIDE HYDROCHLORIDE 5 MG/ML
10 INJECTION INTRAMUSCULAR; INTRAVENOUS ONCE
Status: COMPLETED | OUTPATIENT
Start: 2022-07-22 | End: 2022-07-22

## 2022-07-22 RX ORDER — ONDANSETRON 2 MG/ML
4 INJECTION INTRAMUSCULAR; INTRAVENOUS EVERY 6 HOURS PRN
Status: DISCONTINUED | OUTPATIENT
Start: 2022-07-22 | End: 2022-07-25 | Stop reason: HOSPADM

## 2022-07-22 RX ORDER — ACETAMINOPHEN 10 MG/ML
1000 INJECTION, SOLUTION INTRAVENOUS ONCE
Status: COMPLETED | OUTPATIENT
Start: 2022-07-22 | End: 2022-07-22

## 2022-07-22 RX ORDER — ACETAMINOPHEN 325 MG/1
650 TABLET ORAL EVERY 4 HOURS PRN
Status: DISCONTINUED | OUTPATIENT
Start: 2022-07-22 | End: 2022-07-25 | Stop reason: HOSPADM

## 2022-07-22 RX ORDER — ROCURONIUM BROMIDE 10 MG/ML
INJECTION, SOLUTION INTRAVENOUS AS NEEDED
Status: DISCONTINUED | OUTPATIENT
Start: 2022-07-22 | End: 2022-07-22 | Stop reason: SURG

## 2022-07-22 RX ORDER — ENOXAPARIN SODIUM 100 MG/ML
40 INJECTION SUBCUTANEOUS DAILY
Status: DISCONTINUED | OUTPATIENT
Start: 2022-07-22 | End: 2022-07-25 | Stop reason: HOSPADM

## 2022-07-22 RX ADMIN — MIDAZOLAM HYDROCHLORIDE 2 MG: 1 INJECTION, SOLUTION INTRAMUSCULAR; INTRAVENOUS at 21:01

## 2022-07-22 RX ADMIN — PROPOFOL 200 MG: 10 INJECTION, EMULSION INTRAVENOUS at 20:52

## 2022-07-22 RX ADMIN — MIDAZOLAM HYDROCHLORIDE 2 MG: 1 INJECTION, SOLUTION INTRAMUSCULAR; INTRAVENOUS at 18:38

## 2022-07-22 RX ADMIN — PIPERACILLIN SODIUM AND TAZOBACTAM SODIUM 4.5 G: 4; 500 INJECTION, POWDER, FOR SOLUTION INTRAVENOUS at 23:12

## 2022-07-22 RX ADMIN — Medication 100 MG: at 19:00

## 2022-07-22 RX ADMIN — SODIUM CHLORIDE, POTASSIUM CHLORIDE, SODIUM LACTATE AND CALCIUM CHLORIDE: 600; 310; 30; 20 INJECTION, SOLUTION INTRAVENOUS at 19:26

## 2022-07-22 RX ADMIN — PROPOFOL 200 MG: 10 INJECTION, EMULSION INTRAVENOUS at 19:00

## 2022-07-22 RX ADMIN — VANCOMYCIN HYDROCHLORIDE 2000 MG: 5 INJECTION, POWDER, LYOPHILIZED, FOR SOLUTION INTRAVENOUS at 23:12

## 2022-07-22 RX ADMIN — LIDOCAINE HYDROCHLORIDE 100 MG: 20 INJECTION, SOLUTION EPIDURAL; INFILTRATION; INTRACAUDAL; PERINEURAL at 19:00

## 2022-07-22 RX ADMIN — MAGNESIUM SULFATE 1 G: 1 INJECTION INTRAVENOUS at 22:50

## 2022-07-22 RX ADMIN — SUGAMMADEX 200 MG: 100 INJECTION, SOLUTION INTRAVENOUS at 19:39

## 2022-07-22 RX ADMIN — ACETAMINOPHEN 1000 MG: 10 INJECTION INTRAVENOUS at 21:06

## 2022-07-22 RX ADMIN — ROCURONIUM BROMIDE 30 MG: 10 INJECTION INTRAVENOUS at 21:15

## 2022-07-22 RX ADMIN — FENTANYL CITRATE 100 MCG: 50 INJECTION, SOLUTION INTRAMUSCULAR; INTRAVENOUS at 19:32

## 2022-07-22 RX ADMIN — Medication 100 MCG: at 19:05

## 2022-07-22 RX ADMIN — ROCURONIUM BROMIDE 50 MG: 10 INJECTION INTRAVENOUS at 20:53

## 2022-07-22 RX ADMIN — LIDOCAINE HYDROCHLORIDE 40 MG: 20 INJECTION, SOLUTION EPIDURAL; INFILTRATION; INTRACAUDAL; PERINEURAL at 20:00

## 2022-07-22 RX ADMIN — METOCLOPRAMIDE HYDROCHLORIDE 10 MG: 5 INJECTION INTRAMUSCULAR; INTRAVENOUS at 18:49

## 2022-07-22 RX ADMIN — PROPOFOL 15 MCG/KG/MIN: 10 INJECTION, EMULSION INTRAVENOUS at 22:28

## 2022-07-22 RX ADMIN — ROCURONIUM BROMIDE 50 MG: 10 INJECTION INTRAVENOUS at 19:05

## 2022-07-22 RX ADMIN — ALBUTEROL SULFATE 2.5 MG: 2.5 SOLUTION RESPIRATORY (INHALATION) at 19:59

## 2022-07-22 RX ADMIN — FAMOTIDINE 10 MG: 10 INJECTION INTRAVENOUS at 18:52

## 2022-07-22 RX ADMIN — CALCIUM GLUCONATE 1 G: 20 INJECTION, SOLUTION INTRAVENOUS at 22:50

## 2022-07-22 RX ADMIN — DEXAMETHASONE SODIUM PHOSPHATE 4 MG: 4 INJECTION, SOLUTION INTRA-ARTICULAR; INTRALESIONAL; INTRAMUSCULAR; INTRAVENOUS; SOFT TISSUE at 19:01

## 2022-07-22 RX ADMIN — SODIUM CHLORIDE 100 ML/HR: 9 INJECTION, SOLUTION INTRAVENOUS at 17:33

## 2022-07-22 RX ADMIN — ONDANSETRON 4 MG: 2 INJECTION INTRAMUSCULAR; INTRAVENOUS at 19:27

## 2022-07-22 NOTE — ANESTHESIA PREPROCEDURE EVALUATION
Anesthesia Evaluation     Patient summary reviewed and Nursing notes reviewed   no history of anesthetic complications:  NPO Solid Status: > 8 hours  NPO Liquid Status: > 2 hours           Airway   Mallampati: III  Neck ROM: limited  Possible difficult intubation  Dental    (+) edentulous    Pulmonary    (+) pneumonia resolved , pulmonary embolism, COPD, asthma,shortness of breath,   (-) no home oxygen    PE comment: Currently saturating 97% on 2L O2 NC. Appears short of breath.  Cardiovascular - negative cardio ROS and normal exam  Exercise tolerance: good (4-7 METS)        Neuro/Psych  (+) seizures (last seizure was 9 years ago. not on anti-seizure meds) well controlled, CVA (right sided weakness) residual symptoms, weakness, numbness, psychiatric history,    GI/Hepatic/Renal/Endo    (+)   renal disease stones, thyroid problem thyroid nodules    Musculoskeletal     (+) back pain, chronic pain, neck pain (cervical radiculopathy s/p cervical fusion),   Abdominal  - normal exam   Substance History - negative use     OB/GYN negative ob/gyn ROS         Other   arthritis,    history of cancer (breast)    ROS/Med Hx Other: Currently admitted for Sepsis secondary to urinary tract infection. Reports intermittent nausea.      Phys Exam Other: Appears fatigued              Anesthesia Plan    ASA 3 - emergent     general     (Patient understands anesthesia not responsible for dental damage.)  intravenous induction     Anesthetic plan, risks, benefits, and alternatives have been provided, discussed and informed consent has been obtained with: patient.    Plan discussed with CRNA.        CODE STATUS:    Level Of Support Discussed With: Patient  Code Status (Patient has no pulse and is not breathing): CPR (Attempt to Resuscitate)  Medical Interventions (Patient has pulse or is breathing): Full Support

## 2022-07-23 ENCOUNTER — APPOINTMENT (OUTPATIENT)
Dept: GENERAL RADIOLOGY | Facility: HOSPITAL | Age: 57
End: 2022-07-23

## 2022-07-23 LAB
ANION GAP SERPL CALCULATED.3IONS-SCNC: 8.9 MMOL/L (ref 5–15)
ARTERIAL PATENCY WRIST A: ABNORMAL
ARTERIAL PATENCY WRIST A: POSITIVE
BACTERIA UR QL AUTO: ABNORMAL /HPF
BASE EXCESS BLDA CALC-SCNC: -2.9 MMOL/L (ref -2–2)
BASE EXCESS BLDA CALC-SCNC: 1.3 MMOL/L (ref -2–2)
BASOPHILS # BLD AUTO: 0.05 10*3/MM3 (ref 0–0.2)
BASOPHILS NFR BLD AUTO: 0.3 % (ref 0–1.5)
BDY SITE: ABNORMAL
BDY SITE: ABNORMAL
BILIRUB UR QL STRIP: NEGATIVE
BUN SERPL-MCNC: 17 MG/DL (ref 6–20)
BUN/CREAT SERPL: 23.3 (ref 7–25)
CALCIUM SPEC-SCNC: 8.8 MG/DL (ref 8.6–10.5)
CHLORIDE SERPL-SCNC: 106 MMOL/L (ref 98–107)
CLARITY UR: ABNORMAL
CO2 SERPL-SCNC: 20.1 MMOL/L (ref 22–29)
COHGB MFR BLD: 0.1 % (ref 0–1.5)
COHGB MFR BLD: 0.5 % (ref 0–1.5)
COLOR UR: ABNORMAL
CREAT SERPL-MCNC: 0.73 MG/DL (ref 0.57–1)
DEPRECATED RDW RBC AUTO: 43.3 FL (ref 37–54)
EGFRCR SERPLBLD CKD-EPI 2021: 94.9 ML/MIN/1.73
EOSINOPHIL # BLD AUTO: 0.03 10*3/MM3 (ref 0–0.4)
EOSINOPHIL NFR BLD AUTO: 0.2 % (ref 0.3–6.2)
ERYTHROCYTE [DISTWIDTH] IN BLOOD BY AUTOMATED COUNT: 13.7 % (ref 12.3–15.4)
FHHB: 7 % (ref 0–5)
FHHB: 9.1 % (ref 0–5)
GLUCOSE BLDC GLUCOMTR-MCNC: 124 MG/DL (ref 70–99)
GLUCOSE BLDC GLUCOMTR-MCNC: 126 MG/DL (ref 70–99)
GLUCOSE BLDC GLUCOMTR-MCNC: 131 MG/DL (ref 70–99)
GLUCOSE BLDC GLUCOMTR-MCNC: 141 MG/DL (ref 70–99)
GLUCOSE SERPL-MCNC: 150 MG/DL (ref 65–99)
GLUCOSE UR STRIP-MCNC: NEGATIVE MG/DL
HCO3 BLDA-SCNC: 22.6 MMOL/L (ref 22–26)
HCO3 BLDA-SCNC: 25.2 MMOL/L (ref 22–26)
HCT VFR BLD AUTO: 34.3 % (ref 34–46.6)
HGB BLD-MCNC: 11.6 G/DL (ref 12–15.9)
HGB BLDA-MCNC: 11.5 G/DL (ref 11.7–14.6)
HGB BLDA-MCNC: 12.5 G/DL (ref 11.7–14.6)
HGB UR QL STRIP.AUTO: ABNORMAL
HYALINE CASTS UR QL AUTO: ABNORMAL /LPF
IMM GRANULOCYTES # BLD AUTO: 0.16 10*3/MM3 (ref 0–0.05)
IMM GRANULOCYTES NFR BLD AUTO: 1 % (ref 0–0.5)
INHALED O2 CONCENTRATION: 21 %
INHALED O2 CONCENTRATION: 30 %
KETONES UR QL STRIP: NEGATIVE
LEUKOCYTE ESTERASE UR QL STRIP.AUTO: ABNORMAL
LYMPHOCYTES # BLD AUTO: 1.09 10*3/MM3 (ref 0.7–3.1)
LYMPHOCYTES NFR BLD AUTO: 6.9 % (ref 19.6–45.3)
MAGNESIUM SERPL-MCNC: 2.3 MG/DL (ref 1.6–2.6)
MCH RBC QN AUTO: 29.3 PG (ref 26.6–33)
MCHC RBC AUTO-ENTMCNC: 33.8 G/DL (ref 31.5–35.7)
MCV RBC AUTO: 86.6 FL (ref 79–97)
METHGB BLD QL: 0 % (ref 0–1.5)
METHGB BLD QL: 0.1 % (ref 0–1.5)
MODALITY: ABNORMAL
MODALITY: ABNORMAL
MONOCYTES # BLD AUTO: 0.65 10*3/MM3 (ref 0.1–0.9)
MONOCYTES NFR BLD AUTO: 4.1 % (ref 5–12)
NEUTROPHILS NFR BLD AUTO: 13.83 10*3/MM3 (ref 1.7–7)
NEUTROPHILS NFR BLD AUTO: 87.5 % (ref 42.7–76)
NITRITE UR QL STRIP: NEGATIVE
NOTE: ABNORMAL
NRBC BLD AUTO-RTO: 0 /100 WBC (ref 0–0.2)
OXYHGB MFR BLDV: 90.8 % (ref 94–99)
OXYHGB MFR BLDV: 92.4 % (ref 94–99)
PCO2 BLDA: 37.3 MM HG (ref 35–45)
PCO2 BLDA: 42 MM HG (ref 35–45)
PEEP RESPIRATORY: 5 CM[H2O]
PH BLDA: 7.35 PH UNITS (ref 7.35–7.45)
PH BLDA: 7.45 PH UNITS (ref 7.35–7.45)
PH UR STRIP.AUTO: 6 [PH] (ref 5–8)
PLATELET # BLD AUTO: 184 10*3/MM3 (ref 140–450)
PMV BLD AUTO: 10.8 FL (ref 6–12)
PO2 BLD: 215 MM[HG] (ref 0–500)
PO2 BLD: 259 MM[HG] (ref 0–500)
PO2 BLDA: 54.4 MM HG (ref 80–100)
PO2 BLDA: 64.4 MM HG (ref 80–100)
POTASSIUM SERPL-SCNC: 5.1 MMOL/L (ref 3.5–5.2)
PROT UR QL STRIP: ABNORMAL
RBC # BLD AUTO: 3.96 10*6/MM3 (ref 3.77–5.28)
RBC # UR STRIP: ABNORMAL /HPF
REF LAB TEST METHOD: ABNORMAL
SAO2 % BLDCOA: 90.9 % (ref 95–99)
SAO2 % BLDCOA: 93 % (ref 95–99)
SODIUM SERPL-SCNC: 135 MMOL/L (ref 136–145)
SP GR UR STRIP: 1.02 (ref 1–1.03)
SQUAMOUS #/AREA URNS HPF: ABNORMAL /HPF
UROBILINOGEN UR QL STRIP: ABNORMAL
VANCOMYCIN SERPL-MCNC: 27.2 MCG/ML (ref 5–40)
VENTILATOR MODE: ABNORMAL
WBC # UR STRIP: ABNORMAL /HPF
WBC NRBC COR # BLD: 15.81 10*3/MM3 (ref 3.4–10.8)

## 2022-07-23 PROCEDURE — 94640 AIRWAY INHALATION TREATMENT: CPT

## 2022-07-23 PROCEDURE — 71045 X-RAY EXAM CHEST 1 VIEW: CPT

## 2022-07-23 PROCEDURE — 81001 URINALYSIS AUTO W/SCOPE: CPT | Performed by: INTERNAL MEDICINE

## 2022-07-23 PROCEDURE — 83735 ASSAY OF MAGNESIUM: CPT | Performed by: UROLOGY

## 2022-07-23 PROCEDURE — 25010000002 FENTANYL CITRATE (PF) 50 MCG/ML SOLUTION: Performed by: INTERNAL MEDICINE

## 2022-07-23 PROCEDURE — 99231 SBSQ HOSP IP/OBS SF/LOW 25: CPT | Performed by: UROLOGY

## 2022-07-23 PROCEDURE — 25010000002 ENOXAPARIN PER 10 MG: Performed by: UROLOGY

## 2022-07-23 PROCEDURE — 94799 UNLISTED PULMONARY SVC/PX: CPT

## 2022-07-23 PROCEDURE — 36600 WITHDRAWAL OF ARTERIAL BLOOD: CPT | Performed by: INTERNAL MEDICINE

## 2022-07-23 PROCEDURE — 5A1935Z RESPIRATORY VENTILATION, LESS THAN 24 CONSECUTIVE HOURS: ICD-10-PCS | Performed by: INTERNAL MEDICINE

## 2022-07-23 PROCEDURE — 99291 CRITICAL CARE FIRST HOUR: CPT | Performed by: INTERNAL MEDICINE

## 2022-07-23 PROCEDURE — 94003 VENT MGMT INPAT SUBQ DAY: CPT

## 2022-07-23 PROCEDURE — 82805 BLOOD GASES W/O2 SATURATION: CPT | Performed by: INTERNAL MEDICINE

## 2022-07-23 PROCEDURE — 36600 WITHDRAWAL OF ARTERIAL BLOOD: CPT | Performed by: HOSPITALIST

## 2022-07-23 PROCEDURE — 82375 ASSAY CARBOXYHB QUANT: CPT | Performed by: HOSPITALIST

## 2022-07-23 PROCEDURE — 0BH17EZ INSERTION OF ENDOTRACHEAL AIRWAY INTO TRACHEA, VIA NATURAL OR ARTIFICIAL OPENING: ICD-10-PCS | Performed by: INTERNAL MEDICINE

## 2022-07-23 PROCEDURE — 0 CEFTRIAXONE PER 250 MG: Performed by: PHYSICIAN ASSISTANT

## 2022-07-23 PROCEDURE — 80048 BASIC METABOLIC PNL TOTAL CA: CPT | Performed by: UROLOGY

## 2022-07-23 PROCEDURE — 25010000002 MORPHINE PER 10 MG: Performed by: UROLOGY

## 2022-07-23 PROCEDURE — 85025 COMPLETE CBC W/AUTO DIFF WBC: CPT | Performed by: UROLOGY

## 2022-07-23 PROCEDURE — 83050 HGB METHEMOGLOBIN QUAN: CPT | Performed by: INTERNAL MEDICINE

## 2022-07-23 PROCEDURE — 80202 ASSAY OF VANCOMYCIN: CPT | Performed by: HOSPITALIST

## 2022-07-23 PROCEDURE — 83050 HGB METHEMOGLOBIN QUAN: CPT | Performed by: HOSPITALIST

## 2022-07-23 PROCEDURE — 25010000002 PIPERACILLIN SOD-TAZOBACTAM PER 1 G: Performed by: HOSPITALIST

## 2022-07-23 PROCEDURE — 82962 GLUCOSE BLOOD TEST: CPT

## 2022-07-23 PROCEDURE — 99233 SBSQ HOSP IP/OBS HIGH 50: CPT | Performed by: INTERNAL MEDICINE

## 2022-07-23 PROCEDURE — 82805 BLOOD GASES W/O2 SATURATION: CPT | Performed by: HOSPITALIST

## 2022-07-23 PROCEDURE — 25010000002 MAGNESIUM SULFATE IN D5W 1G/100ML (PREMIX) 1-5 GM/100ML-% SOLUTION: Performed by: HOSPITALIST

## 2022-07-23 PROCEDURE — 82375 ASSAY CARBOXYHB QUANT: CPT | Performed by: INTERNAL MEDICINE

## 2022-07-23 PROCEDURE — 99406 BEHAV CHNG SMOKING 3-10 MIN: CPT | Performed by: INTERNAL MEDICINE

## 2022-07-23 PROCEDURE — 87086 URINE CULTURE/COLONY COUNT: CPT | Performed by: INTERNAL MEDICINE

## 2022-07-23 RX ORDER — IPRATROPIUM BROMIDE AND ALBUTEROL SULFATE 2.5; .5 MG/3ML; MG/3ML
3 SOLUTION RESPIRATORY (INHALATION) EVERY 4 HOURS PRN
Status: DISCONTINUED | OUTPATIENT
Start: 2022-07-23 | End: 2022-07-24

## 2022-07-23 RX ORDER — KETOCONAZOLE 20 MG/ML
1 SHAMPOO TOPICAL 2 TIMES WEEKLY
COMMUNITY
Start: 2022-07-11 | End: 2022-07-25

## 2022-07-23 RX ORDER — FENTANYL CITRATE 50 UG/ML
50 INJECTION, SOLUTION INTRAMUSCULAR; INTRAVENOUS
Status: DISCONTINUED | OUTPATIENT
Start: 2022-07-23 | End: 2022-07-23

## 2022-07-23 RX ORDER — SODIUM CHLORIDE, SODIUM LACTATE, POTASSIUM CHLORIDE, CALCIUM CHLORIDE 600; 310; 30; 20 MG/100ML; MG/100ML; MG/100ML; MG/100ML
100 INJECTION, SOLUTION INTRAVENOUS CONTINUOUS
Status: DISCONTINUED | OUTPATIENT
Start: 2022-07-23 | End: 2022-07-24

## 2022-07-23 RX ORDER — CEFTRIAXONE SODIUM 2 G/50ML
2 INJECTION, SOLUTION INTRAVENOUS EVERY 24 HOURS
Status: DISCONTINUED | OUTPATIENT
Start: 2022-07-23 | End: 2022-07-25 | Stop reason: HOSPADM

## 2022-07-23 RX ADMIN — HYDROCODONE BITARTRATE AND ACETAMINOPHEN 1 TABLET: 5; 325 TABLET ORAL at 13:29

## 2022-07-23 RX ADMIN — MORPHINE SULFATE 1 MG: 2 INJECTION, SOLUTION INTRAMUSCULAR; INTRAVENOUS at 23:24

## 2022-07-23 RX ADMIN — PIPERACILLIN AND TAZOBACTAM 4.5 G: 4; .5 INJECTION, POWDER, FOR SOLUTION INTRAVENOUS at 06:52

## 2022-07-23 RX ADMIN — PAROXETINE HYDROCHLORIDE 40 MG: 20 TABLET, FILM COATED ORAL at 09:32

## 2022-07-23 RX ADMIN — ARFORMOTEROL TARTRATE 15 MCG: 15 SOLUTION RESPIRATORY (INHALATION) at 18:37

## 2022-07-23 RX ADMIN — SODIUM CHLORIDE, POTASSIUM CHLORIDE, SODIUM LACTATE AND CALCIUM CHLORIDE 100 ML/HR: 600; 310; 30; 20 INJECTION, SOLUTION INTRAVENOUS at 18:13

## 2022-07-23 RX ADMIN — LEVOTHYROXINE SODIUM 112 MCG: 0.11 TABLET ORAL at 09:32

## 2022-07-23 RX ADMIN — ARFORMOTEROL TARTRATE 15 MCG: 15 SOLUTION RESPIRATORY (INHALATION) at 07:19

## 2022-07-23 RX ADMIN — BUDESONIDE 0.5 MG: 0.5 SUSPENSION RESPIRATORY (INHALATION) at 18:37

## 2022-07-23 RX ADMIN — GABAPENTIN 100 MG: 100 CAPSULE ORAL at 20:37

## 2022-07-23 RX ADMIN — SODIUM CHLORIDE, POTASSIUM CHLORIDE, SODIUM LACTATE AND CALCIUM CHLORIDE 100 ML/HR: 600; 310; 30; 20 INJECTION, SOLUTION INTRAVENOUS at 11:45

## 2022-07-23 RX ADMIN — CEFTRIAXONE SODIUM 2 G: 2 INJECTION, SOLUTION INTRAVENOUS at 11:43

## 2022-07-23 RX ADMIN — Medication 10 ML: at 09:32

## 2022-07-23 RX ADMIN — SENNOSIDES AND DOCUSATE SODIUM 2 TABLET: 50; 8.6 TABLET ORAL at 09:32

## 2022-07-23 RX ADMIN — HYDROCODONE BITARTRATE AND ACETAMINOPHEN 1 TABLET: 5; 325 TABLET ORAL at 21:35

## 2022-07-23 RX ADMIN — BUDESONIDE 0.5 MG: 0.5 SUSPENSION RESPIRATORY (INHALATION) at 07:19

## 2022-07-23 RX ADMIN — HYDROCODONE BITARTRATE AND ACETAMINOPHEN 1 TABLET: 5; 325 TABLET ORAL at 17:31

## 2022-07-23 RX ADMIN — ENOXAPARIN SODIUM 40 MG: 100 INJECTION SUBCUTANEOUS at 09:32

## 2022-07-23 RX ADMIN — FENTANYL CITRATE 50 MCG: 50 INJECTION, SOLUTION INTRAMUSCULAR; INTRAVENOUS at 01:14

## 2022-07-23 RX ADMIN — GABAPENTIN 100 MG: 100 CAPSULE ORAL at 09:32

## 2022-07-23 RX ADMIN — MAGNESIUM SULFATE 1 G: 1 INJECTION INTRAVENOUS at 00:02

## 2022-07-23 RX ADMIN — IPRATROPIUM BROMIDE AND ALBUTEROL SULFATE 3 ML: .5; 3 SOLUTION RESPIRATORY (INHALATION) at 07:19

## 2022-07-23 RX ADMIN — NICOTINE 1 PATCH: 21 PATCH, EXTENDED RELEASE TRANSDERMAL at 09:31

## 2022-07-23 RX ADMIN — IPRATROPIUM BROMIDE AND ALBUTEROL SULFATE 3 ML: .5; 3 SOLUTION RESPIRATORY (INHALATION) at 21:50

## 2022-07-23 NOTE — ANESTHESIA POSTPROCEDURE EVALUATION
Patient: Tia Silva    Procedure Summary     Date: 07/22/22 Room / Location: [facility] OR 07 / [facility] MAIN OR    Anesthesia Start: 1855 Anesthesia Stop: 2129    Procedure: CYSTOSCOPY URETERAL CATHETER/STENT INSERTION (Right ) Diagnosis:       Calculus of right ureter      Acute pyelonephritis      (Calculus of right ureter [N20.1])      (Acute pyelonephritis [N10])    Surgeons: Gustavo Rivera MD Provider: Jennifer Haque DO    Anesthesia Type: general ASA Status: 3 - Emergent          Anesthesia Type: general    Vitals  No vitals data found for the desired time range.          Post Anesthesia Care and Evaluation    Patient location during evaluation: bedside  Patient participation: complete - patient cannot participate  Post-procedure mental status: sedated.  Pain management: adequate    Airway patency: patent  Anesthetic complications: No anesthetic complications  PONV Status: none  Cardiovascular status: acceptable  Respiratory status: acceptable, intubated and ventilator  Hydration status: acceptable    Comments: Patient was hypercapneic and wheezing when emerging from anesthesia. Albuterol nebulizer treatment and patient hyperventilated to reduce hypercapnia. ABG was done, showing a respiratory acidosis with pH 7.1 and PCO2 67. CXR was done, showing infiltrates. Patient was re-sedated and ETT was exchanged for a larger 8.0 ETT. Patient was transported to the ICU sedated. Report given to hospitalist Dr. Orellana and ICU RN at bedside.  No anesthesia care post op

## 2022-07-24 ENCOUNTER — APPOINTMENT (OUTPATIENT)
Dept: CT IMAGING | Facility: HOSPITAL | Age: 57
End: 2022-07-24

## 2022-07-24 LAB
ALBUMIN SERPL-MCNC: 3 G/DL (ref 3.5–5.2)
ALBUMIN/GLOB SERPL: 0.9 G/DL
ALP SERPL-CCNC: 79 U/L (ref 39–117)
ALT SERPL W P-5'-P-CCNC: 21 U/L (ref 1–33)
ANION GAP SERPL CALCULATED.3IONS-SCNC: 10.2 MMOL/L (ref 5–15)
AST SERPL-CCNC: 22 U/L (ref 1–32)
BASOPHILS # BLD AUTO: 0.04 10*3/MM3 (ref 0–0.2)
BASOPHILS NFR BLD AUTO: 0.4 % (ref 0–1.5)
BILIRUB SERPL-MCNC: 0.3 MG/DL (ref 0–1.2)
BUN SERPL-MCNC: 15 MG/DL (ref 6–20)
BUN/CREAT SERPL: 22.1 (ref 7–25)
CALCIUM SPEC-SCNC: 8.4 MG/DL (ref 8.6–10.5)
CHLORIDE SERPL-SCNC: 102 MMOL/L (ref 98–107)
CO2 SERPL-SCNC: 23.8 MMOL/L (ref 22–29)
CREAT SERPL-MCNC: 0.68 MG/DL (ref 0.57–1)
DEPRECATED RDW RBC AUTO: 45.1 FL (ref 37–54)
EGFRCR SERPLBLD CKD-EPI 2021: 100.5 ML/MIN/1.73
EOSINOPHIL # BLD AUTO: 0.05 10*3/MM3 (ref 0–0.4)
EOSINOPHIL NFR BLD AUTO: 0.5 % (ref 0.3–6.2)
ERYTHROCYTE [DISTWIDTH] IN BLOOD BY AUTOMATED COUNT: 13.7 % (ref 12.3–15.4)
GLOBULIN UR ELPH-MCNC: 3.3 GM/DL
GLUCOSE BLDC GLUCOMTR-MCNC: 101 MG/DL (ref 70–99)
GLUCOSE BLDC GLUCOMTR-MCNC: 102 MG/DL (ref 70–99)
GLUCOSE SERPL-MCNC: 106 MG/DL (ref 65–99)
HCT VFR BLD AUTO: 33 % (ref 34–46.6)
HGB BLD-MCNC: 10.7 G/DL (ref 12–15.9)
IMM GRANULOCYTES # BLD AUTO: 0.03 10*3/MM3 (ref 0–0.05)
IMM GRANULOCYTES NFR BLD AUTO: 0.3 % (ref 0–0.5)
LYMPHOCYTES # BLD AUTO: 1.69 10*3/MM3 (ref 0.7–3.1)
LYMPHOCYTES NFR BLD AUTO: 17.8 % (ref 19.6–45.3)
MAGNESIUM SERPL-MCNC: 1.8 MG/DL (ref 1.6–2.6)
MCH RBC QN AUTO: 29.1 PG (ref 26.6–33)
MCHC RBC AUTO-ENTMCNC: 32.4 G/DL (ref 31.5–35.7)
MCV RBC AUTO: 89.7 FL (ref 79–97)
MONOCYTES # BLD AUTO: 0.84 10*3/MM3 (ref 0.1–0.9)
MONOCYTES NFR BLD AUTO: 8.8 % (ref 5–12)
NEUTROPHILS NFR BLD AUTO: 6.85 10*3/MM3 (ref 1.7–7)
NEUTROPHILS NFR BLD AUTO: 72.2 % (ref 42.7–76)
NRBC BLD AUTO-RTO: 0 /100 WBC (ref 0–0.2)
NT-PROBNP SERPL-MCNC: 605.3 PG/ML (ref 0–900)
PLATELET # BLD AUTO: 162 10*3/MM3 (ref 140–450)
PMV BLD AUTO: 11.5 FL (ref 6–12)
POTASSIUM SERPL-SCNC: 3.7 MMOL/L (ref 3.5–5.2)
PROCALCITONIN SERPL-MCNC: 0.43 NG/ML (ref 0–0.25)
PROT SERPL-MCNC: 6.3 G/DL (ref 6–8.5)
RBC # BLD AUTO: 3.68 10*6/MM3 (ref 3.77–5.28)
SODIUM SERPL-SCNC: 136 MMOL/L (ref 136–145)
WBC NRBC COR # BLD: 9.5 10*3/MM3 (ref 3.4–10.8)

## 2022-07-24 PROCEDURE — 83880 ASSAY OF NATRIURETIC PEPTIDE: CPT | Performed by: INTERNAL MEDICINE

## 2022-07-24 PROCEDURE — 94760 N-INVAS EAR/PLS OXIMETRY 1: CPT

## 2022-07-24 PROCEDURE — 99233 SBSQ HOSP IP/OBS HIGH 50: CPT | Performed by: INTERNAL MEDICINE

## 2022-07-24 PROCEDURE — 99231 SBSQ HOSP IP/OBS SF/LOW 25: CPT | Performed by: UROLOGY

## 2022-07-24 PROCEDURE — 84145 PROCALCITONIN (PCT): CPT | Performed by: INTERNAL MEDICINE

## 2022-07-24 PROCEDURE — 94761 N-INVAS EAR/PLS OXIMETRY MLT: CPT

## 2022-07-24 PROCEDURE — 80053 COMPREHEN METABOLIC PANEL: CPT | Performed by: INTERNAL MEDICINE

## 2022-07-24 PROCEDURE — 25010000002 MAGNESIUM SULFATE IN D5W 1G/100ML (PREMIX) 1-5 GM/100ML-% SOLUTION: Performed by: INTERNAL MEDICINE

## 2022-07-24 PROCEDURE — 94799 UNLISTED PULMONARY SVC/PX: CPT

## 2022-07-24 PROCEDURE — 83735 ASSAY OF MAGNESIUM: CPT | Performed by: UROLOGY

## 2022-07-24 PROCEDURE — 63710000001 PREDNISONE PER 1 MG: Performed by: INTERNAL MEDICINE

## 2022-07-24 PROCEDURE — 85025 COMPLETE CBC W/AUTO DIFF WBC: CPT | Performed by: UROLOGY

## 2022-07-24 PROCEDURE — 25010000002 FUROSEMIDE PER 20 MG: Performed by: INTERNAL MEDICINE

## 2022-07-24 PROCEDURE — 25010000002 ENOXAPARIN PER 10 MG: Performed by: UROLOGY

## 2022-07-24 PROCEDURE — 0 CEFTRIAXONE PER 250 MG: Performed by: PHYSICIAN ASSISTANT

## 2022-07-24 PROCEDURE — 25010000002 MORPHINE PER 10 MG: Performed by: INTERNAL MEDICINE

## 2022-07-24 PROCEDURE — 87040 BLOOD CULTURE FOR BACTERIA: CPT | Performed by: INTERNAL MEDICINE

## 2022-07-24 PROCEDURE — 25010000002 MORPHINE PER 10 MG: Performed by: HOSPITALIST

## 2022-07-24 PROCEDURE — 25010000002 CALCIUM GLUCONATE-NACL 1-0.675 GM/50ML-% SOLUTION: Performed by: INTERNAL MEDICINE

## 2022-07-24 PROCEDURE — 0 IOPAMIDOL PER 1 ML: Performed by: INTERNAL MEDICINE

## 2022-07-24 PROCEDURE — 82962 GLUCOSE BLOOD TEST: CPT

## 2022-07-24 PROCEDURE — 71275 CT ANGIOGRAPHY CHEST: CPT

## 2022-07-24 PROCEDURE — 94660 CPAP INITIATION&MGMT: CPT

## 2022-07-24 RX ORDER — PREDNISONE 20 MG/1
40 TABLET ORAL
Status: DISCONTINUED | OUTPATIENT
Start: 2022-07-24 | End: 2022-07-24

## 2022-07-24 RX ORDER — IPRATROPIUM BROMIDE AND ALBUTEROL SULFATE 2.5; .5 MG/3ML; MG/3ML
3 SOLUTION RESPIRATORY (INHALATION)
Status: DISCONTINUED | OUTPATIENT
Start: 2022-07-24 | End: 2022-07-25 | Stop reason: HOSPADM

## 2022-07-24 RX ORDER — NALOXONE HCL 0.4 MG/ML
0.4 VIAL (ML) INJECTION
Status: DISCONTINUED | OUTPATIENT
Start: 2022-07-23 | End: 2022-07-25 | Stop reason: HOSPADM

## 2022-07-24 RX ORDER — HYDROCODONE BITARTRATE AND ACETAMINOPHEN 10; 325 MG/1; MG/1
1 TABLET ORAL EVERY 4 HOURS PRN
Status: DISCONTINUED | OUTPATIENT
Start: 2022-07-24 | End: 2022-07-25 | Stop reason: HOSPADM

## 2022-07-24 RX ORDER — AZITHROMYCIN 250 MG/1
500 TABLET, FILM COATED ORAL DAILY
Status: COMPLETED | OUTPATIENT
Start: 2022-07-24 | End: 2022-07-24

## 2022-07-24 RX ORDER — NAPROXEN 250 MG/1
500 TABLET ORAL 2 TIMES DAILY WITH MEALS
Status: DISCONTINUED | OUTPATIENT
Start: 2022-07-24 | End: 2022-07-25 | Stop reason: HOSPADM

## 2022-07-24 RX ORDER — IPRATROPIUM BROMIDE AND ALBUTEROL SULFATE 2.5; .5 MG/3ML; MG/3ML
3 SOLUTION RESPIRATORY (INHALATION)
Status: DISCONTINUED | OUTPATIENT
Start: 2022-07-24 | End: 2022-07-24

## 2022-07-24 RX ORDER — POTASSIUM CHLORIDE 750 MG/1
40 CAPSULE, EXTENDED RELEASE ORAL ONCE
Status: COMPLETED | OUTPATIENT
Start: 2022-07-24 | End: 2022-07-24

## 2022-07-24 RX ORDER — MAGNESIUM SULFATE 1 G/100ML
1 INJECTION INTRAVENOUS
Status: COMPLETED | OUTPATIENT
Start: 2022-07-24 | End: 2022-07-24

## 2022-07-24 RX ORDER — MORPHINE SULFATE 2 MG/ML
2 INJECTION, SOLUTION INTRAMUSCULAR; INTRAVENOUS EVERY 4 HOURS PRN
Status: DISCONTINUED | OUTPATIENT
Start: 2022-07-23 | End: 2022-07-25 | Stop reason: HOSPADM

## 2022-07-24 RX ORDER — FUROSEMIDE 10 MG/ML
40 INJECTION INTRAMUSCULAR; INTRAVENOUS ONCE
Status: COMPLETED | OUTPATIENT
Start: 2022-07-24 | End: 2022-07-24

## 2022-07-24 RX ORDER — AZITHROMYCIN 250 MG/1
250 TABLET, FILM COATED ORAL DAILY
Status: DISCONTINUED | OUTPATIENT
Start: 2022-07-25 | End: 2022-07-24

## 2022-07-24 RX ORDER — CALCIUM GLUCONATE 20 MG/ML
1 INJECTION, SOLUTION INTRAVENOUS ONCE
Status: COMPLETED | OUTPATIENT
Start: 2022-07-24 | End: 2022-07-24

## 2022-07-24 RX ORDER — HYDROCODONE BITARTRATE AND ACETAMINOPHEN 5; 325 MG/1; MG/1
1 TABLET ORAL EVERY 4 HOURS PRN
Status: DISCONTINUED | OUTPATIENT
Start: 2022-07-24 | End: 2022-07-24

## 2022-07-24 RX ADMIN — MAGNESIUM SULFATE 1 G: 1 INJECTION INTRAVENOUS at 06:30

## 2022-07-24 RX ADMIN — ARFORMOTEROL TARTRATE 15 MCG: 15 SOLUTION RESPIRATORY (INHALATION) at 19:04

## 2022-07-24 RX ADMIN — AZITHROMYCIN MONOHYDRATE 500 MG: 250 TABLET ORAL at 09:06

## 2022-07-24 RX ADMIN — SENNOSIDES AND DOCUSATE SODIUM 2 TABLET: 50; 8.6 TABLET ORAL at 21:20

## 2022-07-24 RX ADMIN — NAPROXEN 500 MG: 250 TABLET ORAL at 18:19

## 2022-07-24 RX ADMIN — PAROXETINE HYDROCHLORIDE 40 MG: 20 TABLET, FILM COATED ORAL at 10:26

## 2022-07-24 RX ADMIN — HYDROCODONE BITARTRATE AND ACETAMINOPHEN 1 TABLET: 10; 325 TABLET ORAL at 21:22

## 2022-07-24 RX ADMIN — ARFORMOTEROL TARTRATE 15 MCG: 15 SOLUTION RESPIRATORY (INHALATION) at 07:12

## 2022-07-24 RX ADMIN — HYDROCODONE BITARTRATE AND ACETAMINOPHEN 1 TABLET: 5; 325 TABLET ORAL at 09:35

## 2022-07-24 RX ADMIN — BUDESONIDE 0.5 MG: 0.5 SUSPENSION RESPIRATORY (INHALATION) at 19:06

## 2022-07-24 RX ADMIN — MORPHINE SULFATE 2 MG: 2 INJECTION, SOLUTION INTRAMUSCULAR; INTRAVENOUS at 04:26

## 2022-07-24 RX ADMIN — GABAPENTIN 100 MG: 100 CAPSULE ORAL at 08:13

## 2022-07-24 RX ADMIN — MORPHINE SULFATE 4 MG: 4 INJECTION, SOLUTION INTRAMUSCULAR; INTRAVENOUS at 12:07

## 2022-07-24 RX ADMIN — LEVOTHYROXINE SODIUM 112 MCG: 0.11 TABLET ORAL at 06:53

## 2022-07-24 RX ADMIN — IPRATROPIUM BROMIDE AND ALBUTEROL SULFATE 3 ML: .5; 3 SOLUTION RESPIRATORY (INHALATION) at 13:52

## 2022-07-24 RX ADMIN — GABAPENTIN 100 MG: 100 CAPSULE ORAL at 21:20

## 2022-07-24 RX ADMIN — HYDROCODONE POLISTIREX AND CHLORPHENIRAMINE POLISTIREX 5 ML: 10; 8 SUSPENSION, EXTENDED RELEASE ORAL at 18:19

## 2022-07-24 RX ADMIN — MORPHINE SULFATE 2 MG: 2 INJECTION, SOLUTION INTRAMUSCULAR; INTRAVENOUS at 08:11

## 2022-07-24 RX ADMIN — CEFTRIAXONE SODIUM 2 G: 2 INJECTION, SOLUTION INTRAVENOUS at 12:50

## 2022-07-24 RX ADMIN — POTASSIUM CHLORIDE 40 MEQ: 750 CAPSULE, EXTENDED RELEASE ORAL at 06:53

## 2022-07-24 RX ADMIN — CALCIUM GLUCONATE 1 G: 20 INJECTION, SOLUTION INTRAVENOUS at 12:07

## 2022-07-24 RX ADMIN — NICOTINE 1 PATCH: 21 PATCH, EXTENDED RELEASE TRANSDERMAL at 09:10

## 2022-07-24 RX ADMIN — FUROSEMIDE 40 MG: 10 INJECTION, SOLUTION INTRAMUSCULAR; INTRAVENOUS at 06:53

## 2022-07-24 RX ADMIN — PREDNISONE 40 MG: 20 TABLET ORAL at 12:54

## 2022-07-24 RX ADMIN — SODIUM CHLORIDE, POTASSIUM CHLORIDE, SODIUM LACTATE AND CALCIUM CHLORIDE 100 ML/HR: 600; 310; 30; 20 INJECTION, SOLUTION INTRAVENOUS at 04:14

## 2022-07-24 RX ADMIN — HYDROCODONE BITARTRATE AND ACETAMINOPHEN 1 TABLET: 10; 325 TABLET ORAL at 16:25

## 2022-07-24 RX ADMIN — ENOXAPARIN SODIUM 40 MG: 100 INJECTION SUBCUTANEOUS at 09:08

## 2022-07-24 RX ADMIN — MAGNESIUM SULFATE 1 G: 1 INJECTION INTRAVENOUS at 09:10

## 2022-07-24 RX ADMIN — IOPAMIDOL 80 ML: 755 INJECTION, SOLUTION INTRAVENOUS at 00:24

## 2022-07-24 RX ADMIN — SENNOSIDES AND DOCUSATE SODIUM 2 TABLET: 50; 8.6 TABLET ORAL at 10:26

## 2022-07-24 RX ADMIN — IPRATROPIUM BROMIDE AND ALBUTEROL SULFATE 3 ML: .5; 3 SOLUTION RESPIRATORY (INHALATION) at 19:06

## 2022-07-24 RX ADMIN — BUDESONIDE 0.5 MG: 0.5 SUSPENSION RESPIRATORY (INHALATION) at 07:12

## 2022-07-25 ENCOUNTER — READMISSION MANAGEMENT (OUTPATIENT)
Dept: CALL CENTER | Facility: HOSPITAL | Age: 57
End: 2022-07-25

## 2022-07-25 VITALS
BODY MASS INDEX: 34.53 KG/M2 | DIASTOLIC BLOOD PRESSURE: 78 MMHG | HEIGHT: 67 IN | RESPIRATION RATE: 14 BRPM | TEMPERATURE: 98.1 F | WEIGHT: 220.02 LBS | SYSTOLIC BLOOD PRESSURE: 150 MMHG | HEART RATE: 68 BPM | OXYGEN SATURATION: 91 %

## 2022-07-25 LAB
BACTERIA SPEC AEROBE CULT: NO GROWTH
GLUCOSE BLDC GLUCOMTR-MCNC: 103 MG/DL (ref 70–99)
GLUCOSE BLDC GLUCOMTR-MCNC: 180 MG/DL (ref 70–99)
GLUCOSE BLDC GLUCOMTR-MCNC: 93 MG/DL (ref 70–99)

## 2022-07-25 PROCEDURE — 94761 N-INVAS EAR/PLS OXIMETRY MLT: CPT

## 2022-07-25 PROCEDURE — 94799 UNLISTED PULMONARY SVC/PX: CPT

## 2022-07-25 PROCEDURE — 99231 SBSQ HOSP IP/OBS SF/LOW 25: CPT | Performed by: UROLOGY

## 2022-07-25 PROCEDURE — 25010000002 ENOXAPARIN PER 10 MG: Performed by: UROLOGY

## 2022-07-25 PROCEDURE — 82962 GLUCOSE BLOOD TEST: CPT

## 2022-07-25 PROCEDURE — 99239 HOSP IP/OBS DSCHRG MGMT >30: CPT | Performed by: INTERNAL MEDICINE

## 2022-07-25 PROCEDURE — 99232 SBSQ HOSP IP/OBS MODERATE 35: CPT | Performed by: INTERNAL MEDICINE

## 2022-07-25 RX ORDER — NAPROXEN 500 MG/1
500 TABLET ORAL 2 TIMES DAILY WITH MEALS
Qty: 10 TABLET | Refills: 0 | Status: SHIPPED | OUTPATIENT
Start: 2022-07-25 | End: 2022-07-30

## 2022-07-25 RX ORDER — LEVOTHYROXINE SODIUM 112 UG/1
112 TABLET ORAL
Start: 2022-07-26

## 2022-07-25 RX ORDER — NICOTINE 21 MG/24HR
1 PATCH, TRANSDERMAL 24 HOURS TRANSDERMAL
Qty: 14 PATCH | Refills: 0 | Status: SHIPPED | OUTPATIENT
Start: 2022-07-26 | End: 2023-01-17 | Stop reason: ALTCHOICE

## 2022-07-25 RX ORDER — PAROXETINE HYDROCHLORIDE 40 MG/1
40 TABLET, FILM COATED ORAL DAILY
Start: 2022-07-26

## 2022-07-25 RX ORDER — CEFDINIR 300 MG/1
300 CAPSULE ORAL 2 TIMES DAILY
Qty: 24 CAPSULE | Refills: 0 | Status: SHIPPED | OUTPATIENT
Start: 2022-07-25 | End: 2022-08-06

## 2022-07-25 RX ORDER — HYDROCODONE BITARTRATE AND ACETAMINOPHEN 10; 325 MG/1; MG/1
1 TABLET ORAL EVERY 6 HOURS PRN
Qty: 16 TABLET | Refills: 0 | Status: SHIPPED | OUTPATIENT
Start: 2022-07-25 | End: 2022-07-31

## 2022-07-25 RX ADMIN — SENNOSIDES AND DOCUSATE SODIUM 2 TABLET: 50; 8.6 TABLET ORAL at 09:25

## 2022-07-25 RX ADMIN — ARFORMOTEROL TARTRATE 15 MCG: 15 SOLUTION RESPIRATORY (INHALATION) at 07:55

## 2022-07-25 RX ADMIN — PAROXETINE HYDROCHLORIDE 40 MG: 20 TABLET, FILM COATED ORAL at 10:10

## 2022-07-25 RX ADMIN — GABAPENTIN 100 MG: 100 CAPSULE ORAL at 09:25

## 2022-07-25 RX ADMIN — NAPROXEN 500 MG: 250 TABLET ORAL at 09:25

## 2022-07-25 RX ADMIN — BUDESONIDE 0.5 MG: 0.5 SUSPENSION RESPIRATORY (INHALATION) at 07:55

## 2022-07-25 RX ADMIN — ACETAMINOPHEN 650 MG: 325 TABLET ORAL at 06:35

## 2022-07-25 RX ADMIN — IPRATROPIUM BROMIDE AND ALBUTEROL SULFATE 3 ML: .5; 3 SOLUTION RESPIRATORY (INHALATION) at 00:18

## 2022-07-25 RX ADMIN — ENOXAPARIN SODIUM 40 MG: 100 INJECTION SUBCUTANEOUS at 09:26

## 2022-07-25 RX ADMIN — Medication 10 ML: at 09:26

## 2022-07-25 RX ADMIN — HYDROCODONE BITARTRATE AND ACETAMINOPHEN 1 TABLET: 10; 325 TABLET ORAL at 01:49

## 2022-07-25 RX ADMIN — LEVOTHYROXINE SODIUM 112 MCG: 0.11 TABLET ORAL at 06:35

## 2022-07-25 RX ADMIN — IPRATROPIUM BROMIDE AND ALBUTEROL SULFATE 3 ML: .5; 3 SOLUTION RESPIRATORY (INHALATION) at 07:55

## 2022-07-25 RX ADMIN — NICOTINE 1 PATCH: 21 PATCH, EXTENDED RELEASE TRANSDERMAL at 09:25

## 2022-07-25 RX ADMIN — HYDROCODONE POLISTIREX AND CHLORPHENIRAMINE POLISTIREX 5 ML: 10; 8 SUSPENSION, EXTENDED RELEASE ORAL at 06:30

## 2022-07-25 RX ADMIN — IPRATROPIUM BROMIDE AND ALBUTEROL SULFATE 3 ML: .5; 3 SOLUTION RESPIRATORY (INHALATION) at 12:16

## 2022-07-26 ENCOUNTER — TELEPHONE (OUTPATIENT)
Dept: UROLOGY | Facility: CLINIC | Age: 57
End: 2022-07-26

## 2022-07-26 NOTE — TELEPHONE ENCOUNTER
----- Message from BRIANNA Bowles sent at 7/25/2022  4:10 PM EDT -----  Regarding: FW: f/u  Please place on my schedule for f/u on Thursday or Friday  ----- Message -----  From: Gustavo Rivera MD  Sent: 7/25/2022  10:43 AM EDT  To: BRIANNA Bowles  Subject: f/u                                              This patient had stent placed 7/22 for urosepsis. Going home today. E.coli in blood. Pan sensis. At [place]. Mid right ureteral stone. I placed a stent. Needs f/u later this week and stone removed next week. Is going home on antibiotic, but please make sure she stays on a low dose antibiotic until her surgery is scheduled

## 2022-07-26 NOTE — OUTREACH NOTE
Prep Survey    Flowsheet Row Responses   [facility]? [facility]   Is LACE score < 7 ? No   Emergency Room discharge w/ pulse ox? No   Eligibility Readm Mgmt   Discharge diagnosis Sepsis due to urinary tract infection    Does the patient have one of the following disease processes/diagnoses(primary or secondary)? Sepsis   Does the patient have Home health ordered? No   Is there a DME ordered? No   Prep survey completed? Yes          MARK LOPEZ - Registered Nurse

## 2022-07-29 ENCOUNTER — READMISSION MANAGEMENT (OUTPATIENT)
Dept: CALL CENTER | Facility: HOSPITAL | Age: 57
End: 2022-07-29

## 2022-07-29 ENCOUNTER — OFFICE VISIT (OUTPATIENT)
Dept: UROLOGY | Facility: CLINIC | Age: 57
End: 2022-07-29

## 2022-07-29 VITALS — HEIGHT: 67 IN | RESPIRATION RATE: 16 BRPM | WEIGHT: 217.8 LBS | BODY MASS INDEX: 34.18 KG/M2

## 2022-07-29 DIAGNOSIS — N20.0 KIDNEY STONE: Primary | ICD-10-CM

## 2022-07-29 LAB
BACTERIA SPEC AEROBE CULT: NORMAL
BACTERIA SPEC AEROBE CULT: NORMAL
BILIRUB BLD-MCNC: NEGATIVE MG/DL
CLARITY, POC: ABNORMAL
COLOR UR: ABNORMAL
EXPIRATION DATE: ABNORMAL
GLUCOSE UR STRIP-MCNC: NEGATIVE MG/DL
KETONES UR QL: NEGATIVE
LEUKOCYTE EST, POC: ABNORMAL
Lab: ABNORMAL
NITRITE UR-MCNC: NEGATIVE MG/ML
PH UR: 6 [PH] (ref 5–8)
PROT UR STRIP-MCNC: ABNORMAL MG/DL
RBC # UR STRIP: ABNORMAL /UL
SP GR UR: 1.03 (ref 1–1.03)
UROBILINOGEN UR QL: NORMAL

## 2022-07-29 PROCEDURE — 87086 URINE CULTURE/COLONY COUNT: CPT | Performed by: NURSE PRACTITIONER

## 2022-07-29 PROCEDURE — 99213 OFFICE O/P EST LOW 20 MIN: CPT | Performed by: NURSE PRACTITIONER

## 2022-07-29 RX ORDER — PHENAZOPYRIDINE HYDROCHLORIDE 100 MG/1
100 TABLET, FILM COATED ORAL 3 TIMES DAILY PRN
Qty: 20 TABLET | Refills: 0 | Status: ON HOLD | OUTPATIENT
Start: 2022-07-29 | End: 2022-08-08 | Stop reason: SDUPTHER

## 2022-07-29 NOTE — PROGRESS NOTES
Chief Complaint: Follow-up (Kidney stone)    Subjective         History of Present Illness  Tia Silva is a 57 y.o. female presents to [facility] UROLOGY to be seen for renal stent f/u.    Patient was seen in the emergency department 7/22/22 .  At [place] emergency department.  Patient with Bilateral flank pain x3 weeks which worsened the day before she presented for tretment.    She had a CT scan of the abdomen and pelvis without contrast performed which revealed a 7 mm obstructing stone in the distal right ureter with moderate right hydroureteronephrosis.  She was found to have a urinary tract infection and her white blood cell count was elevated to 18,000.  the patient was transferred and admitted to [facility] for further evaluation.    The patient underwent a cystoscopy with ureteral stent placement on 7/22/2022.    She states she is having pressure and burning with urination.     She is on cefdinir currently.     Objective     Past Medical History:   Diagnosis Date   • Arthritis    • Asthma    • Back pain    • Breast abscess 06/17/2014   • Cancer ([facility])     BREAST   • Cholecystitis, chronic    • Cholelithiasis    • COPD (chronic obstructive pulmonary disease) ([facility])    • Cystitis 08/03/2016   • Hemiplegia ([facility])     RIGHT SIDED WEAKNESS   • History of bladder infections    • History of bronchitis    • History of pneumonia    • Kidney stones    • Left nephrolithiasis 05/12/2016   • Pain management    • Pulmonary embolism ([facility])    • Rectocele    • Right nephrolithiasis 05/12/2016   • Seizure (HCC) 2011   • Stress incontinence    • Stroke (cerebrum) ([facility])    • Thyroid disease     NODULES       Past Surgical History:   Procedure Laterality Date   • BACK SURGERY  2020    [facility]   • BLADDER SURGERY      LIFT   • CHOLECYSTECTOMY N/A 2014    Dr. Adriano Tubbs   • COLONOSCOPY N/A 2015    [place]   • CYSTOSCOPY BLADDER BIOPSY     • CYSTOSCOPY URETEROSCOPY LASER  LITHOTRIPSY     • CYSTOSCOPY W/ URETERAL STENT PLACEMENT Right 7/22/2022    Procedure: CYSTOSCOPY URETERAL CATHETER/STENT INSERTION;  Surgeon: Gustavo Rivera MD;  Location: [facility] OR;  Service: Urology;  Laterality: Right;   • HYSTERECTOMY  2000    [place], [place]   • INCISION AND DRAINAGE BREAST ABSCESS Left    • INCISIONAL HERNIA REPAIR N/A 02/28/2017    Epigastric Incisional Hernia repair w/ mesh (C-Qur V-patch 8 cm mesh placed under fascia), Dr. Javier Oh, [facility]   • INGUINAL HERNIA REPAIR N/A 10/19/2021    Procedure: Abdominal wall exploration with mesh removal and ventral hernia repair;  Surgeon: Millie Hummel MD;  [place]  [facility] [place] [facility] [place]  Service: General;  Laterality: N/A;   • KIDNEY STONE SURGERY     • LUMBAR EPIDURAL INJECTION      Steriod injections   • TUBAL ABDOMINAL LIGATION           Current Outpatient Medications:   •  Breztri Aerosphere 160-9-4.8 MCG/ACT aerosol inhaler, Inhale 2 puffs 2 (Two) Times a Day., Disp: , Rfl:   •  cefdinir (OMNICEF) 300 MG capsule, Take 1 capsule by mouth 2 (Two) Times a Day for 12 days., Disp: 24 capsule, Rfl: 0  •  gabapentin (NEURONTIN) 100 MG capsule, Take 100 mg by mouth 2 (Two) Times a Day., Disp: , Rfl:   •  HYDROcodone-acetaminophen (NORCO)  MG per tablet, Take 1 tablet by mouth Every 6 (Six) Hours As Needed for Moderate Pain  for up to 6 days., Disp: 16 tablet, Rfl: 0  •  levothyroxine (SYNTHROID, LEVOTHROID) 112 MCG tablet, Take 1 tablet by mouth Every Morning., Disp: , Rfl:   •  naproxen (NAPROSYN) 500 MG tablet, Take 1 tablet by mouth 2 (Two) Times a Day With Meals for 5 days., Disp: 10 tablet, Rfl: 0  •  nicotine (NICODERM CQ) 21 MG/24HR patch, Place 1 patch on the skin as directed by provider Daily., Disp: 14 patch, Rfl: 0  •  PARoxetine (PAXIL) 40 MG tablet, Take 1 tablet by mouth Daily., Disp: , Rfl:   •  phenazopyridine (PYRIDIUM) 100 MG tablet, Take 1 tablet by mouth 3 (Three) Times a Day As Needed for  "Bladder Spasms., Disp: 20 tablet, Rfl: 0    Allergies   Allergen Reactions   • Bupropion Other (See Comments)     VOMITING, DELERIUM        Family History   Problem Relation Age of Onset   • Kidney cancer Brother    • Malig Hyperthermia Neg Hx        Social History     Socioeconomic History   • Marital status:    Tobacco Use   • Smoking status: Current Every Day Smoker     Packs/day: 1.00     Years: 25.00     Pack years: 25.00     Types: Cigarettes   • Smokeless tobacco: Never Used   Vaping Use   • Vaping Use: Never used   Substance and Sexual Activity   • Alcohol use: Never   • Drug use: Never   • Sexual activity: Defer       Vital Signs:   Resp 16   Ht 170.2 cm (67\")   Wt 98.8 kg (217 lb 12.8 oz)   BMI 34.11 kg/m²      Physical Exam     Result Review :   The following data was reviewed by: BRIANNA Bowles on 07/29/2022:  Results for orders placed or performed in visit on 07/29/22   POC Urinalysis Dipstick, Automated    Specimen: Urine   Result Value Ref Range    Color Dark Yellow Yellow, Straw, Dark Yellow, Lyudmila    Clarity, UA Cloudy (A) Clear    Specific Gravity  1.030 1.005 - 1.030    pH, Urine 6.0 5.0 - 8.0    Leukocytes Small (1+) (A) Negative    Nitrite, UA Negative Negative    Protein,  mg/dL (A) Negative mg/dL    Glucose, UA Negative Negative mg/dL    Ketones, UA Negative Negative    Urobilinogen, UA Normal Normal    Bilirubin Negative Negative    Blood, UA Large (A) Negative    Lot Number [id number]     Expiration Date 2,023/7             Procedures        Assessment and Plan    Diagnoses and all orders for this visit:    1. Kidney stone (Primary)  -     POC Urinalysis Dipstick, Automated  -     Urine Culture - Urine, Urine, Clean Catch; Future  -     phenazopyridine (PYRIDIUM) 100 MG tablet; Take 1 tablet by mouth 3 (Three) Times a Day As Needed for Bladder Spasms.  Dispense: 20 tablet; Refill: 0  -     Urine Culture - Urine, Urine, Clean Catch      We will send urine for culture. " She is clinically doing well at this time.  Discussed with patient will send urine for culture to ensure her infection has remained treated.    Discussed with patient worrisome symptoms to present to the emergency department for over the weekend if she is with any fever over 100.4 with nausea vomiting and increase in pain.    We will follow-up with Dr. Edgar as scheduled    I spent 10 minutes caring for Tia on this date of service. This time includes time spent by me in the following activities:reviewing tests, obtaining and/or reviewing a separately obtained history, performing a medically appropriate examination and/or evaluation , counseling and educating the patient/family/caregiver, ordering medications, tests, or procedures, and documenting information in the medical record  Follow Up   Return for Next scheduled follow up.  Patient was given instructions and counseling regarding her condition or for health maintenance advice. Please see specific information pulled into the AVS if appropriate.         This document has been electronically signed by BRIANNA Bowles  July 29, 2022 14:54 EDT

## 2022-07-29 NOTE — OUTREACH NOTE
Sepsis Week 1 Survey    Flowsheet Row Responses   [facility] patient discharged from? [facility]   Does the patient have one of the following disease processes/diagnoses(primary or secondary)? Sepsis   Week 1 attempt successful? Yes   Call start time 1008   Call end time 1017   Discharge diagnosis Sepsis due to urinary tract infection    Is patient permission given to speak with other caregiver? Yes   List who call center can speak with    Meds reviewed with patient/caregiver? Yes   Is the patient having any side effects they believe may be caused by any medication additions or changes? No   Does the patient have all medications related to this admission filled (includes all antibiotics, inhalers, nebulizers,steroids,etc.) Yes   Is the patient taking all medications as directed (includes completed medication regime)? Yes   Does the patient have a primary care provider?  Yes   Comments regarding PCP f/u with urology 7-29   Does the patient have an appointment with their PCP within 7 days of discharge? Yes   Has the patient kept scheduled appointments due by today? N/A   Psychosocial issues? No   Comments Pain is 7/10 and drops 3/10 with meds. Has had some N/V ocassion, feels like retaining fluid edema in LE but that is improving. Urinary stent in place, urine has blood in it, red in color, burning with urine passing. Denies fever.    Did the patient receive a copy of their discharge instructions? Yes   Nursing interventions Reviewed instructions with patient   What is the patient's perception of their health status since discharge? Improving   Nursing interventions Nurse provided patient education   Is the patient/caregiver able to teach back Sepsis? S - Shivering,fever or very cold, S - Sleepy, difficult to arouse,confused, P - Pale or discolored skin   Nursing interventions Nurse provided reassurance to patient   Is patient/caregiver able to teach back steps to recovery at home? Set small, achievable goals  for return to baseline health, Rest and regain strength, Eat a balanced diet   Is the patient/caregiver able to teach back signs and symptoms of worsening condition: Hyperthermia, Fever, Rapid heart rate (>90)   If the patient is a current smoker, are they able to teach back resources for cessation? --  [has stopped smoking, only smoked 1x since DC. ]   Is the patient/caregiver able to teach back the hierarchy of who to call/visit for symptoms/problems? PCP, Specialist, Home health nurse, Urgent Care, ED, 911 Yes   Week 1 call completed? Yes          DANICA DE LEON - Registered Nurse

## 2022-07-30 LAB — BACTERIA SPEC AEROBE CULT: NO GROWTH

## 2022-08-04 ENCOUNTER — PREP FOR SURGERY (OUTPATIENT)
Dept: OTHER | Facility: HOSPITAL | Age: 57
End: 2022-08-04

## 2022-08-04 ENCOUNTER — OFFICE VISIT (OUTPATIENT)
Dept: UROLOGY | Facility: CLINIC | Age: 57
End: 2022-08-04

## 2022-08-04 VITALS — BODY MASS INDEX: 33.65 KG/M2 | RESPIRATION RATE: 16 BRPM | HEIGHT: 67 IN | WEIGHT: 214.4 LBS

## 2022-08-04 DIAGNOSIS — T19.9XXA FOREIGN BODY IN GENITOURINARY TRACT, INITIAL ENCOUNTER: ICD-10-CM

## 2022-08-04 DIAGNOSIS — N20.1 RIGHT URETERAL STONE: Primary | ICD-10-CM

## 2022-08-04 DIAGNOSIS — N20.1 URETERAL STONE: Primary | ICD-10-CM

## 2022-08-04 PROCEDURE — 99213 OFFICE O/P EST LOW 20 MIN: CPT | Performed by: UROLOGY

## 2022-08-04 RX ORDER — CEFAZOLIN SODIUM 2 G/100ML
2 INJECTION, SOLUTION INTRAVENOUS ONCE
Status: CANCELLED | OUTPATIENT
Start: 2022-08-04 | End: 2022-08-04

## 2022-08-04 NOTE — H&P (VIEW-ONLY)
UROLOGY OFFICE follow-up NOTE    Subjective   HPI  Tia Silva is a 57 y.o. female.  Presents for follow-up after inpatient admission requiring right ureteral stent insertion by Dr. Rivera.    The patient has a history of kidney stones. She has had surgery on stones in the past.   The patient has a stent in, but the stone is still present.     Her last urine culture on 07/29/2022 was negative. The stent was placed on 07/22/2022. The largest stone on the right side is 5 mm.    The patient has a history of gallbladder, spine, hernia, and kidney stones; she has had multiple surgeries over the years.     The patient states that she stopped breathing a few minutes while the stent was placed; she was placed on a ventilator and taken to the ICU but recovered quickly. The patient has a history of COPD. She has been using nicotine patches. The patient has a quit date for 08/08/2022.    __________________  CT abdomen pelvis, ([facility]) 7/22/2022: 7 mm obstructing stone in the distal right ureter with associated moderate right hydroureteronephrosis; also intrarenal stones up to 5 mm per report        Results for orders placed or performed in visit on 07/29/22   Urine Culture - Urine, Urine, Clean Catch    Specimen: Urine, Clean Catch   Result Value Ref Range    Urine Culture No growth    POC Urinalysis Dipstick, Automated    Specimen: Urine   Result Value Ref Range    Color Dark Yellow Yellow, Straw, Dark Yellow, Lyudmila    Clarity, UA Cloudy (A) Clear    Specific Gravity  1.030 1.005 - 1.030    pH, Urine 6.0 5.0 - 8.0    Leukocytes Small (1+) (A) Negative    Nitrite, UA Negative Negative    Protein,  mg/dL (A) Negative mg/dL    Glucose, UA Negative Negative mg/dL    Ketones, UA Negative Negative    Urobilinogen, UA Normal Normal    Bilirubin Negative Negative    Blood, UA Large (A) Negative    Lot Number [id number]     Expiration Date 2,023/7          Medical History:  Past Medical History:   Diagnosis Date    • Arthritis    • Asthma    • Back pain    • Breast abscess 06/17/2014   • Cancer (HCC)     BREAST   • Cholecystitis, chronic    • Cholelithiasis    • COPD (chronic obstructive pulmonary disease) (HCC)    • Cystitis 08/03/2016   • Hemiplegia (HCC)     RIGHT SIDED WEAKNESS   • History of bladder infections    • History of bronchitis    • History of pneumonia    • Kidney stones    • Left nephrolithiasis 05/12/2016   • Pain management    • Pulmonary embolism (HCC)    • Rectocele    • Right nephrolithiasis 05/12/2016   • Seizure (HCC) 2011   • Stress incontinence    • Stroke (cerebrum) ([facility])    • Thyroid disease     NODULES        Social History:  Social History     Socioeconomic History   • Marital status:    Tobacco Use   • Smoking status: Current Every Day Smoker     Packs/day: 1.00     Years: 25.00     Pack years: 25.00     Types: Cigarettes   • Smokeless tobacco: Never Used   Vaping Use   • Vaping Use: Never used   Substance and Sexual Activity   • Alcohol use: Never   • Drug use: Never   • Sexual activity: Defer        Family History:  Family History   Problem Relation Age of Onset   • Kidney cancer Brother    • Malig Hyperthermia Neg Hx         Surgical History:  Past Surgical History:   Procedure Laterality Date   • BACK SURGERY  2020    [facility]   • BLADDER SURGERY      LIFT   • CHOLECYSTECTOMY N/A 2014    Dr. Adriano Tubbs   • COLONOSCOPY N/A 2015    [place]   • CYSTOSCOPY BLADDER BIOPSY     • CYSTOSCOPY URETEROSCOPY LASER LITHOTRIPSY     • CYSTOSCOPY W/ URETERAL STENT PLACEMENT Right 7/22/2022    Procedure: CYSTOSCOPY URETERAL CATHETER/STENT INSERTION;  Surgeon: Gustavo Rivera MD;  Location: [facility];  Service: Urology;  Laterality: Right;   • HYSTERECTOMY  2000    [place], [place]   • INCISION AND DRAINAGE BREAST ABSCESS Left    • INCISIONAL HERNIA REPAIR N/A 02/28/2017    Epigastric Incisional Hernia repair w/ mesh (C-Qur V-patch 8 cm mesh placed under fascia), Dr. Javier MCCRACKEN  "[facility]   • INGUINAL HERNIA REPAIR N/A 10/19/2021    Procedure: Abdominal wall exploration with mesh removal and ventral hernia repair;  Surgeon: Millie Hummel MD;  [place] [facility] [place] [facility] [place];  Service: General;  Laterality: N/A;   • KIDNEY STONE SURGERY     • LUMBAR EPIDURAL INJECTION      Steriod injections   • TUBAL ABDOMINAL LIGATION          Allergies:  Allergies   Allergen Reactions   • Bupropion Other (See Comments)     VOMITING, DELERIUM        Current Medications:  Current Outpatient Medications   Medication Sig Dispense Refill   • Breztri Aerosphere 160-9-4.8 MCG/ACT aerosol inhaler Inhale 2 puffs 2 (Two) Times a Day.     • gabapentin (NEURONTIN) 100 MG capsule Take 100 mg by mouth 2 (Two) Times a Day.     • levothyroxine (SYNTHROID, LEVOTHROID) 112 MCG tablet Take 1 tablet by mouth Every Morning.     • nicotine (NICODERM CQ) 21 MG/24HR patch Place 1 patch on the skin as directed by provider Daily. 14 patch 0   • PARoxetine (PAXIL) 40 MG tablet Take 1 tablet by mouth Daily.     • phenazopyridine (PYRIDIUM) 100 MG tablet Take 1 tablet by mouth 3 (Three) Times a Day As Needed for Bladder Spasms. 20 tablet 0     No current facility-administered medications for this visit.       Review of systems  A review of systems was performed, and positive findings are noted in the HPI.    Objective     Vital Signs:   Resp 16   Ht 170.2 cm (67\")   Wt 97.3 kg (214 lb 6.4 oz)   BMI 33.58 kg/m²       Physical exam  No acute distress, well-nourished  Awake alert and oriented  Mood normal; affect normal  CV: Regular rate rhythm; no chest retractions  Lungs: Clear, unlabored    Problem List:  Patient Active Problem List   Diagnosis   • Abscess   • Back pain   • Nephrolithiasis   • Cholecystitis, chronic   • Cholelithiasis   • Cystitis   • Hemiplegia (HCC)   • Seizures (HCC)   • Abdominal wall mass of right upper quadrant   • Status post hernia repair   • Cervical disc disease   • " Cervical radiculopathy   • Cervical stenosis of spine   • COPD (chronic obstructive pulmonary disease) (HCC)   • Cough   • Depression   • Degeneration of intervertebral disc of lumbar region   • History of kidney stones   • Hypothyroidism   • Lumbar spondylosis   • Postlaminectomy syndrome of cervical region   • Rhinosinusitis   • Right upper quadrant abdominal pain   • Stress incontinence, female   • Tobacco abuse   • S/P cervical spinal fusion   • Dandruff in adult   • Sepsis due to urinary tract infection (HCC)   • Calculus of right ureter   • Acute pyelonephritis       Assessment & Plan   Diagnoses and all orders for this visit:    1. Ureteral stone (Primary)    2. Foreign body in genitourinary tract, initial encounter      Right ureteral calculi status post ureteral stent insertion; warrants definitive stone management.  No evidence of infection.  Will proceed with cystoscopy, right retrograde pyelogram, ureteroscopy, laser lithotripsy, stent exchange.    Risks, benefits and alternatives were discussed with patient including bleeding, infection, damage to surrounding structures, stone recurrence, stricture, and pain.  Aware of the risks of anesthesia including up ICU admission, ventilatory support, and even death.  Patient also notes understanding that if unable to reach the level of the stone or if significant purulent discharge noted upon initiation of procedure that stent will be placed in definitive stone management will be deferred until the collecting system is optimized.  Schedule OR  All question addressed at this time.      MDM low: 1 stable chronic illness; decision regarding surgery including patient or procedure identified risk factors    Transcribed from ambient dictation for Corie Edgar MD by Melanie Salcedo.  08/04/22   14:22 EDT    Patient verbalized consent to the visit recording.  I have personally performed the services described in this document as transcribed by the above individual,  and it is both accurate and complete.  Corie Edgar MD  8/7/2022  07:47 EDT

## 2022-08-04 NOTE — PROGRESS NOTES
UROLOGY OFFICE follow-up NOTE    Subjective   HPI  Tia Silva is a 57 y.o. female.  Presents for follow-up after inpatient admission requiring right ureteral stent insertion by Dr. Rivera.    The patient has a history of kidney stones. She has had surgery on stones in the past.   The patient has a stent in, but the stone is still present.     Her last urine culture on 07/29/2022 was negative. The stent was placed on 07/22/2022. The largest stone on the right side is 5 mm.    The patient has a history of gallbladder, spine, hernia, and kidney stones; she has had multiple surgeries over the years.     The patient states that she stopped breathing a few minutes while the stent was placed; she was placed on a ventilator and taken to the ICU but recovered quickly. The patient has a history of COPD. She has been using nicotine patches. The patient has a quit date for 08/08/2022.    __________________  CT abdomen pelvis, ([facility]) 7/22/2022: 7 mm obstructing stone in the distal right ureter with associated moderate right hydroureteronephrosis; also intrarenal stones up to 5 mm per report        Results for orders placed or performed in visit on 07/29/22   Urine Culture - Urine, Urine, Clean Catch    Specimen: Urine, Clean Catch   Result Value Ref Range    Urine Culture No growth    POC Urinalysis Dipstick, Automated    Specimen: Urine   Result Value Ref Range    Color Dark Yellow Yellow, Straw, Dark Yellow, Lyudmila    Clarity, UA Cloudy (A) Clear    Specific Gravity  1.030 1.005 - 1.030    pH, Urine 6.0 5.0 - 8.0    Leukocytes Small (1+) (A) Negative    Nitrite, UA Negative Negative    Protein,  mg/dL (A) Negative mg/dL    Glucose, UA Negative Negative mg/dL    Ketones, UA Negative Negative    Urobilinogen, UA Normal Normal    Bilirubin Negative Negative    Blood, UA Large (A) Negative    Lot Number [id number]     Expiration Date 2,023/7          Medical History:  Past Medical History:   Diagnosis Date    • Arthritis    • Asthma    • Back pain    • Breast abscess 06/17/2014   • Cancer (HCC)     BREAST   • Cholecystitis, chronic    • Cholelithiasis    • COPD (chronic obstructive pulmonary disease) (HCC)    • Cystitis 08/03/2016   • Hemiplegia (HCC)     RIGHT SIDED WEAKNESS   • History of bladder infections    • History of bronchitis    • History of pneumonia    • Kidney stones    • Left nephrolithiasis 05/12/2016   • Pain management    • Pulmonary embolism (HCC)    • Rectocele    • Right nephrolithiasis 05/12/2016   • Seizure (HCC) 2011   • Stress incontinence    • Stroke (cerebrum) ([facility])    • Thyroid disease     NODULES        Social History:  Social History     Socioeconomic History   • Marital status:    Tobacco Use   • Smoking status: Current Every Day Smoker     Packs/day: 1.00     Years: 25.00     Pack years: 25.00     Types: Cigarettes   • Smokeless tobacco: Never Used   Vaping Use   • Vaping Use: Never used   Substance and Sexual Activity   • Alcohol use: Never   • Drug use: Never   • Sexual activity: Defer        Family History:  Family History   Problem Relation Age of Onset   • Kidney cancer Brother    • Malig Hyperthermia Neg Hx         Surgical History:  Past Surgical History:   Procedure Laterality Date   • BACK SURGERY  2020    [facility]   • BLADDER SURGERY      LIFT   • CHOLECYSTECTOMY N/A 2014    Dr. Adriano Tubbs   • COLONOSCOPY N/A 2015    [place]   • CYSTOSCOPY BLADDER BIOPSY     • CYSTOSCOPY URETEROSCOPY LASER LITHOTRIPSY     • CYSTOSCOPY W/ URETERAL STENT PLACEMENT Right 7/22/2022    Procedure: CYSTOSCOPY URETERAL CATHETER/STENT INSERTION;  Surgeon: Gustavo Rivera MD;  Location: [facility];  Service: Urology;  Laterality: Right;   • HYSTERECTOMY  2000    [place]   • INCISION AND DRAINAGE BREAST ABSCESS Left    • INCISIONAL HERNIA REPAIR N/A 02/28/2017    Epigastric Incisional Hernia repair w/ mesh (C-Qur V-patch 8 cm mesh placed under fascia), Dr. Javier MCCRACKEN  "[facility]   • INGUINAL HERNIA REPAIR N/A 10/19/2021    Procedure: Abdominal wall exploration with mesh removal and ventral hernia repair;  Surgeon: Millie Hummel MD;  [place] [facility] [place] [facility] [place]  Service: General;  Laterality: N/A;   • KIDNEY STONE SURGERY     • LUMBAR EPIDURAL INJECTION      Steriod injections   • TUBAL ABDOMINAL LIGATION          Allergies:  Allergies   Allergen Reactions   • Bupropion Other (See Comments)     VOMITING, DELERIUM        Current Medications:  Current Outpatient Medications   Medication Sig Dispense Refill   • Breztri Aerosphere 160-9-4.8 MCG/ACT aerosol inhaler Inhale 2 puffs 2 (Two) Times a Day.     • gabapentin (NEURONTIN) 100 MG capsule Take 100 mg by mouth 2 (Two) Times a Day.     • levothyroxine (SYNTHROID, LEVOTHROID) 112 MCG tablet Take 1 tablet by mouth Every Morning.     • nicotine (NICODERM CQ) 21 MG/24HR patch Place 1 patch on the skin as directed by provider Daily. 14 patch 0   • PARoxetine (PAXIL) 40 MG tablet Take 1 tablet by mouth Daily.     • phenazopyridine (PYRIDIUM) 100 MG tablet Take 1 tablet by mouth 3 (Three) Times a Day As Needed for Bladder Spasms. 20 tablet 0     No current facility-administered medications for this visit.       Review of systems  A review of systems was performed, and positive findings are noted in the HPI.    Objective     Vital Signs:   Resp 16   Ht 170.2 cm (67\")   Wt 97.3 kg (214 lb 6.4 oz)   BMI 33.58 kg/m²       Physical exam  No acute distress, well-nourished  Awake alert and oriented  Mood normal; affect normal  CV: Regular rate rhythm; no chest retractions  Lungs: Clear, unlabored    Problem List:  Patient Active Problem List   Diagnosis   • Abscess   • Back pain   • Nephrolithiasis   • Cholecystitis, chronic   • Cholelithiasis   • Cystitis   • Hemiplegia (HCC)   • Seizures (HCC)   • Abdominal wall mass of right upper quadrant   • Status post hernia repair   • Cervical disc disease   • " Cervical radiculopathy   • Cervical stenosis of spine   • COPD (chronic obstructive pulmonary disease) (HCC)   • Cough   • Depression   • Degeneration of intervertebral disc of lumbar region   • History of kidney stones   • Hypothyroidism   • Lumbar spondylosis   • Postlaminectomy syndrome of cervical region   • Rhinosinusitis   • Right upper quadrant abdominal pain   • Stress incontinence, female   • Tobacco abuse   • S/P cervical spinal fusion   • Dandruff in adult   • Sepsis due to urinary tract infection (HCC)   • Calculus of right ureter   • Acute pyelonephritis       Assessment & Plan   Diagnoses and all orders for this visit:    1. Ureteral stone (Primary)    2. Foreign body in genitourinary tract, initial encounter      Right ureteral calculi status post ureteral stent insertion; warrants definitive stone management.  No evidence of infection.  Will proceed with cystoscopy, right retrograde pyelogram, ureteroscopy, laser lithotripsy, stent exchange.    Risks, benefits and alternatives were discussed with patient including bleeding, infection, damage to surrounding structures, stone recurrence, stricture, and pain.  Aware of the risks of anesthesia including up ICU admission, ventilatory support, and even death.  Patient also notes understanding that if unable to reach the level of the stone or if significant purulent discharge noted upon initiation of procedure that stent will be placed in definitive stone management will be deferred until the collecting system is optimized.  Schedule OR  All question addressed at this time.      MDM low: 1 stable chronic illness; decision regarding surgery including patient or procedure identified risk factors    Transcribed from ambient dictation for Corie Edgar MD by Melanie Salcedo.  08/04/22   14:22 EDT    Patient verbalized consent to the visit recording.  I have personally performed the services described in this document as transcribed by the above individual,  and it is both accurate and complete.  Corie Edgar MD  8/7/2022  07:47 EDT

## 2022-08-08 ENCOUNTER — APPOINTMENT (OUTPATIENT)
Dept: GENERAL RADIOLOGY | Facility: HOSPITAL | Age: 57
End: 2022-08-08

## 2022-08-08 ENCOUNTER — ANESTHESIA (OUTPATIENT)
Dept: PERIOP | Facility: HOSPITAL | Age: 57
End: 2022-08-08

## 2022-08-08 ENCOUNTER — HOSPITAL ENCOUNTER (OUTPATIENT)
Facility: HOSPITAL | Age: 57
Setting detail: HOSPITAL OUTPATIENT SURGERY
Discharge: HOME | End: 2022-08-08
Attending: UROLOGY | Admitting: UROLOGY

## 2022-08-08 ENCOUNTER — ANESTHESIA EVENT (OUTPATIENT)
Dept: PERIOP | Facility: HOSPITAL | Age: 57
End: 2022-08-08

## 2022-08-08 VITALS
TEMPERATURE: 97 F | HEIGHT: 67 IN | BODY MASS INDEX: 33.7 KG/M2 | SYSTOLIC BLOOD PRESSURE: 162 MMHG | OXYGEN SATURATION: 94 % | HEART RATE: 81 BPM | DIASTOLIC BLOOD PRESSURE: 93 MMHG | WEIGHT: 214.73 LBS | RESPIRATION RATE: 16 BRPM

## 2022-08-08 DIAGNOSIS — N20.0 KIDNEY STONE: ICD-10-CM

## 2022-08-08 DIAGNOSIS — N20.1 RIGHT URETERAL STONE: ICD-10-CM

## 2022-08-08 DIAGNOSIS — N20.1 CALCULUS OF RIGHT URETER: Primary | ICD-10-CM

## 2022-08-08 LAB
LAB AP CASE REPORT: NORMAL
LAB AP CLINICAL INFORMATION: NORMAL
PATH REPORT.FINAL DX SPEC: NORMAL
PATH REPORT.GROSS SPEC: NORMAL

## 2022-08-08 PROCEDURE — C1769 GUIDE WIRE: HCPCS | Performed by: UROLOGY

## 2022-08-08 PROCEDURE — 74420 UROGRAPHY RTRGR +-KUB: CPT

## 2022-08-08 PROCEDURE — 25010000002 DEXAMETHASONE PER 1 MG: Performed by: NURSE ANESTHETIST, CERTIFIED REGISTERED

## 2022-08-08 PROCEDURE — 82365 CALCULUS SPECTROSCOPY: CPT | Performed by: UROLOGY

## 2022-08-08 PROCEDURE — 0 IOPAMIDOL PER 1 ML: Performed by: UROLOGY

## 2022-08-08 PROCEDURE — 25010000002 CEFAZOLIN IN DEXTROSE 2-4 GM/100ML-% SOLUTION: Performed by: UROLOGY

## 2022-08-08 PROCEDURE — C2617 STENT, NON-COR, TEM W/O DEL: HCPCS | Performed by: UROLOGY

## 2022-08-08 PROCEDURE — C1758 CATHETER, URETERAL: HCPCS | Performed by: UROLOGY

## 2022-08-08 PROCEDURE — 52356 CYSTO/URETERO W/LITHOTRIPSY: CPT | Performed by: UROLOGY

## 2022-08-08 PROCEDURE — 25010000002 FENTANYL CITRATE (PF) 50 MCG/ML SOLUTION: Performed by: NURSE ANESTHETIST, CERTIFIED REGISTERED

## 2022-08-08 PROCEDURE — 88300 SURGICAL PATH GROSS: CPT | Performed by: UROLOGY

## 2022-08-08 PROCEDURE — 25010000002 MIDAZOLAM PER 1 MG: Performed by: ANESTHESIOLOGY

## 2022-08-08 PROCEDURE — C1894 INTRO/SHEATH, NON-LASER: HCPCS | Performed by: UROLOGY

## 2022-08-08 PROCEDURE — 25010000002 PROPOFOL 10 MG/ML EMULSION: Performed by: NURSE ANESTHETIST, CERTIFIED REGISTERED

## 2022-08-08 DEVICE — STNT CLASSC DBL PIG 4.5F 24CM: Type: IMPLANTABLE DEVICE | Site: URETER | Status: FUNCTIONAL

## 2022-08-08 RX ORDER — TAMSULOSIN HYDROCHLORIDE 0.4 MG/1
1 CAPSULE ORAL DAILY
Qty: 30 CAPSULE | Refills: 0 | Status: SHIPPED | OUTPATIENT
Start: 2022-08-08 | End: 2022-09-27

## 2022-08-08 RX ORDER — ACETAMINOPHEN 500 MG
1000 TABLET ORAL ONCE
Status: COMPLETED | OUTPATIENT
Start: 2022-08-08 | End: 2022-08-08

## 2022-08-08 RX ORDER — MAGNESIUM HYDROXIDE 1200 MG/15ML
LIQUID ORAL AS NEEDED
Status: DISCONTINUED | OUTPATIENT
Start: 2022-08-08 | End: 2022-08-08 | Stop reason: HOSPADM

## 2022-08-08 RX ORDER — FLUCONAZOLE 100 MG/1
100 TABLET ORAL DAILY
Qty: 7 TABLET | Refills: 0 | Status: SHIPPED | OUTPATIENT
Start: 2022-08-08 | End: 2022-08-15

## 2022-08-08 RX ORDER — PROMETHAZINE HYDROCHLORIDE 12.5 MG/1
25 TABLET ORAL ONCE AS NEEDED
Status: DISCONTINUED | OUTPATIENT
Start: 2022-08-08 | End: 2022-08-08 | Stop reason: HOSPADM

## 2022-08-08 RX ORDER — ACETAMINOPHEN 325 MG/1
650 TABLET ORAL ONCE
Status: DISCONTINUED | OUTPATIENT
Start: 2022-08-08 | End: 2022-08-08 | Stop reason: HOSPADM

## 2022-08-08 RX ORDER — IBUPROFEN 600 MG/1
600 TABLET ORAL EVERY 6 HOURS PRN
Status: DISCONTINUED | OUTPATIENT
Start: 2022-08-08 | End: 2022-08-08 | Stop reason: HOSPADM

## 2022-08-08 RX ORDER — SODIUM CHLORIDE, SODIUM LACTATE, POTASSIUM CHLORIDE, CALCIUM CHLORIDE 600; 310; 30; 20 MG/100ML; MG/100ML; MG/100ML; MG/100ML
9 INJECTION, SOLUTION INTRAVENOUS CONTINUOUS PRN
Status: DISCONTINUED | OUTPATIENT
Start: 2022-08-08 | End: 2022-08-08 | Stop reason: HOSPADM

## 2022-08-08 RX ORDER — LIDOCAINE HYDROCHLORIDE 20 MG/ML
INJECTION, SOLUTION EPIDURAL; INFILTRATION; INTRACAUDAL; PERINEURAL AS NEEDED
Status: DISCONTINUED | OUTPATIENT
Start: 2022-08-08 | End: 2022-08-08 | Stop reason: SURG

## 2022-08-08 RX ORDER — CEFAZOLIN SODIUM 2 G/100ML
2 INJECTION, SOLUTION INTRAVENOUS ONCE
Status: COMPLETED | OUTPATIENT
Start: 2022-08-08 | End: 2022-08-08

## 2022-08-08 RX ORDER — MEPERIDINE HYDROCHLORIDE 25 MG/ML
12.5 INJECTION INTRAMUSCULAR; INTRAVENOUS; SUBCUTANEOUS
Status: DISCONTINUED | OUTPATIENT
Start: 2022-08-08 | End: 2022-08-08 | Stop reason: HOSPADM

## 2022-08-08 RX ORDER — HYDROCODONE BITARTRATE AND ACETAMINOPHEN 5; 325 MG/1; MG/1
1 TABLET ORAL EVERY 6 HOURS PRN
COMMUNITY
End: 2022-08-08 | Stop reason: HOSPADM

## 2022-08-08 RX ORDER — MIDAZOLAM HYDROCHLORIDE 1 MG/ML
2 INJECTION INTRAMUSCULAR; INTRAVENOUS ONCE
Status: COMPLETED | OUTPATIENT
Start: 2022-08-08 | End: 2022-08-08

## 2022-08-08 RX ORDER — FENTANYL CITRATE 50 UG/ML
INJECTION, SOLUTION INTRAMUSCULAR; INTRAVENOUS AS NEEDED
Status: DISCONTINUED | OUTPATIENT
Start: 2022-08-08 | End: 2022-08-08 | Stop reason: SURG

## 2022-08-08 RX ORDER — OXYCODONE HYDROCHLORIDE AND ACETAMINOPHEN 5; 325 MG/1; MG/1
1-2 TABLET ORAL EVERY 4 HOURS PRN
Qty: 14 TABLET | Refills: 0 | Status: SHIPPED | OUTPATIENT
Start: 2022-08-08 | End: 2023-01-17 | Stop reason: ALTCHOICE

## 2022-08-08 RX ORDER — PROMETHAZINE HYDROCHLORIDE 25 MG/1
25 SUPPOSITORY RECTAL ONCE AS NEEDED
Status: DISCONTINUED | OUTPATIENT
Start: 2022-08-08 | End: 2022-08-08 | Stop reason: HOSPADM

## 2022-08-08 RX ORDER — ONDANSETRON 2 MG/ML
4 INJECTION INTRAMUSCULAR; INTRAVENOUS ONCE AS NEEDED
Status: DISCONTINUED | OUTPATIENT
Start: 2022-08-08 | End: 2022-08-08 | Stop reason: HOSPADM

## 2022-08-08 RX ORDER — PHENAZOPYRIDINE HYDROCHLORIDE 100 MG/1
100 TABLET, FILM COATED ORAL 3 TIMES DAILY PRN
Qty: 20 TABLET | Refills: 0 | Status: SHIPPED | OUTPATIENT
Start: 2022-08-08 | End: 2023-01-17

## 2022-08-08 RX ORDER — PROPOFOL 10 MG/ML
VIAL (ML) INTRAVENOUS AS NEEDED
Status: DISCONTINUED | OUTPATIENT
Start: 2022-08-08 | End: 2022-08-08 | Stop reason: SURG

## 2022-08-08 RX ORDER — KETOROLAC TROMETHAMINE 10 MG/1
10 TABLET, FILM COATED ORAL EVERY 6 HOURS PRN
Qty: 8 TABLET | Refills: 0 | Status: SHIPPED | OUTPATIENT
Start: 2022-08-08 | End: 2022-12-02

## 2022-08-08 RX ORDER — PROMETHAZINE HYDROCHLORIDE 12.5 MG/1
12.5 TABLET ORAL ONCE AS NEEDED
Status: DISCONTINUED | OUTPATIENT
Start: 2022-08-08 | End: 2022-08-08 | Stop reason: HOSPADM

## 2022-08-08 RX ORDER — OXYCODONE HYDROCHLORIDE 5 MG/1
5 TABLET ORAL
Status: COMPLETED | OUTPATIENT
Start: 2022-08-08 | End: 2022-08-08

## 2022-08-08 RX ORDER — ROCURONIUM BROMIDE 10 MG/ML
INJECTION, SOLUTION INTRAVENOUS AS NEEDED
Status: DISCONTINUED | OUTPATIENT
Start: 2022-08-08 | End: 2022-08-08 | Stop reason: SURG

## 2022-08-08 RX ORDER — GLYCOPYRROLATE 0.2 MG/ML
0.2 INJECTION INTRAMUSCULAR; INTRAVENOUS
Status: COMPLETED | OUTPATIENT
Start: 2022-08-08 | End: 2022-08-08

## 2022-08-08 RX ORDER — KETAMINE HYDROCHLORIDE 50 MG/ML
INJECTION, SOLUTION, CONCENTRATE INTRAMUSCULAR; INTRAVENOUS AS NEEDED
Status: DISCONTINUED | OUTPATIENT
Start: 2022-08-08 | End: 2022-08-08 | Stop reason: SURG

## 2022-08-08 RX ORDER — ALBUTEROL SULFATE 2.5 MG/3ML
2.5 SOLUTION RESPIRATORY (INHALATION) EVERY 4 HOURS PRN
COMMUNITY

## 2022-08-08 RX ORDER — GLYCOPYRROLATE 0.2 MG/ML
INJECTION INTRAMUSCULAR; INTRAVENOUS AS NEEDED
Status: DISCONTINUED | OUTPATIENT
Start: 2022-08-08 | End: 2022-08-08 | Stop reason: SURG

## 2022-08-08 RX ORDER — ONDANSETRON 4 MG/1
4 TABLET, FILM COATED ORAL ONCE AS NEEDED
Status: DISCONTINUED | OUTPATIENT
Start: 2022-08-08 | End: 2022-08-08 | Stop reason: HOSPADM

## 2022-08-08 RX ORDER — DEXAMETHASONE SODIUM PHOSPHATE 4 MG/ML
INJECTION, SOLUTION INTRA-ARTICULAR; INTRALESIONAL; INTRAMUSCULAR; INTRAVENOUS; SOFT TISSUE AS NEEDED
Status: DISCONTINUED | OUTPATIENT
Start: 2022-08-08 | End: 2022-08-08 | Stop reason: SURG

## 2022-08-08 RX ADMIN — LIDOCAINE HYDROCHLORIDE 70 MG: 20 INJECTION, SOLUTION EPIDURAL; INFILTRATION; INTRACAUDAL; PERINEURAL at 08:43

## 2022-08-08 RX ADMIN — KETAMINE HYDROCHLORIDE 10 MG: 50 INJECTION, SOLUTION INTRAMUSCULAR; INTRAVENOUS at 09:06

## 2022-08-08 RX ADMIN — ROCURONIUM BROMIDE 10 MG: 10 INJECTION INTRAVENOUS at 09:01

## 2022-08-08 RX ADMIN — ACETAMINOPHEN 1000 MG: 500 TABLET ORAL at 08:20

## 2022-08-08 RX ADMIN — FENTANYL CITRATE 25 MCG: 50 INJECTION, SOLUTION INTRAMUSCULAR; INTRAVENOUS at 08:42

## 2022-08-08 RX ADMIN — GLYCOPYRROLATE 0.2 MG: 0.2 INJECTION INTRAMUSCULAR; INTRAVENOUS at 09:03

## 2022-08-08 RX ADMIN — OXYCODONE HYDROCHLORIDE 5 MG: 5 TABLET ORAL at 11:09

## 2022-08-08 RX ADMIN — OXYCODONE HYDROCHLORIDE 5 MG: 5 TABLET ORAL at 10:09

## 2022-08-08 RX ADMIN — SODIUM CHLORIDE, POTASSIUM CHLORIDE, SODIUM LACTATE AND CALCIUM CHLORIDE 9 ML/HR: 600; 310; 30; 20 INJECTION, SOLUTION INTRAVENOUS at 08:21

## 2022-08-08 RX ADMIN — KETAMINE HYDROCHLORIDE 10 MG: 50 INJECTION, SOLUTION INTRAMUSCULAR; INTRAVENOUS at 08:41

## 2022-08-08 RX ADMIN — CEFAZOLIN SODIUM 2 G: 2 INJECTION, SOLUTION INTRAVENOUS at 08:40

## 2022-08-08 RX ADMIN — DEXAMETHASONE SODIUM PHOSPHATE 4 MG: 4 INJECTION, SOLUTION INTRA-ARTICULAR; INTRALESIONAL; INTRAMUSCULAR; INTRAVENOUS; SOFT TISSUE at 08:46

## 2022-08-08 RX ADMIN — SODIUM CHLORIDE, POTASSIUM CHLORIDE, SODIUM LACTATE AND CALCIUM CHLORIDE: 600; 310; 30; 20 INJECTION, SOLUTION INTRAVENOUS at 09:37

## 2022-08-08 RX ADMIN — MIDAZOLAM HYDROCHLORIDE 2 MG: 2 INJECTION, SOLUTION INTRAMUSCULAR; INTRAVENOUS at 08:26

## 2022-08-08 RX ADMIN — FENTANYL CITRATE 50 MCG: 50 INJECTION, SOLUTION INTRAMUSCULAR; INTRAVENOUS at 09:14

## 2022-08-08 RX ADMIN — FENTANYL CITRATE 25 MCG: 50 INJECTION, SOLUTION INTRAMUSCULAR; INTRAVENOUS at 09:06

## 2022-08-08 RX ADMIN — GLYCOPYRROLATE 0.2 MG: 0.2 INJECTION INTRAMUSCULAR; INTRAVENOUS at 08:26

## 2022-08-08 RX ADMIN — SUGAMMADEX 200 MG: 100 INJECTION, SOLUTION INTRAVENOUS at 09:37

## 2022-08-08 RX ADMIN — PROPOFOL 160 MG: 10 INJECTION, EMULSION INTRAVENOUS at 08:44

## 2022-08-08 RX ADMIN — ROCURONIUM BROMIDE 40 MG: 10 INJECTION INTRAVENOUS at 08:45

## 2022-08-08 NOTE — DISCHARGE INSTRUCTIONS
DISCHARGE INSTRUCTIONS  Extracorporeal Shock Wave Lithotripsy (ESWL)/ Ureteroscopy Lasertripsy      For your surgery you had:  General anesthesia (you may have a sore throat for the first 24 hours)  IV sedation  Local anesthesia  Monitored anesthesia care  You received a medicated path for nausea prevention today (behind your ear). It is recommended that you remove it 24-48 hours post-operatively. It must be removed within 72 hours.   You have received an anesthesia medication today that can cause hormonal forms of birth control to be ineffective. You should use a different form of birth control (to prevent pregnancy) for 7 days.   You may experience dizziness, drowsiness, or lightheadedness for several hours following surgery.  Do not stay alone today or tonight.  Limit your activity for 24 hours.  You should not drive or operate machinery, drink alcohol, or sign legally binding documents for 24 hours or while you are taking pain medication.  Resume your diet slowly.  Follow any special dietary instructions you may have been given by your doctor.  Last dose of pain medication was given at:   .   NOTIFY YOUR DOCTOR IF YOU EXPERIENCE ANY OF THE FOLLOWING:  Temperature greater than 101 degrees Fahrenheit  Shaking Chills  Redness or excessive drainage from incision  Nausea, vomiting and/or pain that is not controlled by prescribed medications  Increase in bleeding or bleeding that is excessive  Unable to urinate in 6 hours after surgery  If unable to reach your doctor, please go to the closest Emergency Room  Strain urine if instructed by physician.  Collect any fragments and take with you on your scheduled appointment. You may pass stone pieces or small blood clots.  Blood in your urine is normal.  It could be light pink to cherry color.  Drink 6-8 glasses of fluid each day to assist with passing of stone fragments.  Back pain is common.  It may feel like a dull ache or back spasm.  Urine will be bloody for several  days.  Slight redness or bruising may be noticed on treated side.  If you have difficulty urinating, try sitting in a bathtub of warm water.    If you have a stent, it must be managed by your urologist.  Do NOT forget.  Medications per physician instructions as indicated on Discharge Medication Information Sheet.  You should see   for follow-up care  on  .  Phone number:      SPECIAL INSTRUCTIONS:

## 2022-08-08 NOTE — ANESTHESIA POSTPROCEDURE EVALUATION
Patient: Tia Silva    Procedure Summary     Date: 08/08/22 Room / Location: [facility] OR 07 / [facility] MAIN OR    Anesthesia Start: 0839 Anesthesia Stop: 0949    Procedure: CYSTOSCOPY URETEROSCOPY RETROGRADE PYELOGRAM HOLMIUM LASER STENT exchange, right (Right Ureter) Diagnosis:       Right ureteral stone      (Right ureteral stone [N20.1])    Surgeons: Corie Edgar MD Provider: Lori Mortensen MD    Anesthesia Type: general ASA Status: 3          Anesthesia Type: general    Vitals  Vitals Value Taken Time   /84 08/08/22 1005   Temp 36.6 °C (97.8 °F) 08/08/22 0950   Pulse 80 08/08/22 1008   Resp 16 08/08/22 1005   SpO2 99 % 08/08/22 1008   Vitals shown include unvalidated device data.        Post Anesthesia Care and Evaluation    Patient location during evaluation: bedside  Patient participation: complete - patient participated  Level of consciousness: awake  Pain management: adequate    Airway patency: patent  Anesthetic complications: No anesthetic complications  PONV Status: none  Cardiovascular status: acceptable and stable  Respiratory status: acceptable  Hydration status: acceptable    Comments: An Anesthesiologist personally participated in the most demanding procedures (including induction and emergence if applicable) in the anesthesia plan, monitored the course of anesthesia administration at frequent intervals and remained physically present and available for immediate diagnosis and treatment of emergencies.

## 2022-08-08 NOTE — ANESTHESIA PREPROCEDURE EVALUATION
Anesthesia Evaluation     Patient summary reviewed and Nursing notes reviewed   no history of anesthetic complications:  NPO Solid Status: > 8 hours  NPO Liquid Status: > 2 hours           Airway   Mallampati: III  TM distance: <3 FB  Neck ROM: full  No difficulty expected  Dental    (+) edentulous    Pulmonary - normal exam    breath sounds clear to auscultation  (+) pulmonary embolism, a smoker Current Smoked day of surgery, COPD, asthma,  Cardiovascular - negative cardio ROS and normal exam  Exercise tolerance: good (4-7 METS)    Rhythm: regular  Rate: normal        Neuro/Psych  (+) seizures, CVA, numbness, psychiatric history Depression,    GI/Hepatic/Renal/Endo    (+)   renal disease stones, thyroid problem hypothyroidism    Musculoskeletal     (+) back pain,   Abdominal    Substance History - negative use     OB/GYN negative ob/gyn ROS         Other   arthritis,    history of cancer                    Anesthesia Plan    ASA 3     general     (Patient understands anesthesia not responsible for dental damage.)  intravenous induction     Anesthetic plan, risks, benefits, and alternatives have been provided, discussed and informed consent has been obtained with: patient.    Use of blood products discussed with patient .   Plan discussed with CRNA.        CODE STATUS:

## 2022-08-08 NOTE — OP NOTE
Preoperative diagnosis  Right ureteral stone, nephrolithiasis    Postoperative diagnosis  Right ureteral stone    Procedure performed  Cystoscopy, right retrograde pyelogram, ureteroscopy, laser lithotripsy, and stent exchange    Attending surgeon  Corie Edgar MD     Anesthesia  General    EBL  0 mL    Complications  None    Specimen  Right ureteral stone    Findings  Right distal ureteral stone treated with laser lithotripsy; no right intrarenal stone burden cruciated; mild right hydronephrosis; exchange of 4.5 x 24 ureteral stent    Indications  57 y.o. female agreed to undergo the above named procedure after discussion of the alternatives, risks and benefits.   Informed consent was obtained.      Procedure  After informed consent was obtained.  The patient was taken back to the operating suite where general anesthesia was administered.  Once patient was adequately anesthetized she was placed in the dorsolithotomy position.  Her genitals were prepped and draped in the normal sterile fashion.  The previously placed stent string was visible at the urethral meatus.  It was grasped in the distal stent and was retrieved; sensor wire threaded through it and under x-ray was noted to be an appropriate position in the intrarenal collecting system.  The previously placed stent was removed.  A rigid cystoscope was inserted gently into the urethral meatus and passed atraumatically into the bladder.  Pan cystoscopy was performed and a 360 degree manner.  No abnormalities were noted.   Focus for the remainder of the procedure was placed on the right side.  The wire was seen protruding from the ureteral orifice.  Next, semirigid ureteroscope was used to navigate the distal ureter.  The distal ureteral stone was encountered as expected in the distal ureter, the stone was too large to retrieve.  Patient lithotripsy was initiated to fragment the stone into several pieces.  Pieces were then systematically retrieved until the  ureter was clear of all fragments. Further ureteroscopy up to the level of the renal pelvis did not reveal any ureteral abnormalities further fragments or calculi.  Contrast dye was injected through the ureteroscope indicating mild right hydronephrosis.   A second wire was inserted through the semirigid ureteroscope.  Next the access sheath passed over one of the wires under fluoroscopic guidance.  The flexible ureteroscope was inserted and nephroscopy was performed a systematic manner; there were no free-floating calculi noted.  Direct visualization and x-ray guidance were used to ensure all calyces were inspected.  There was no intrarenal stone burden appreciated.  Finally, the ureteroscope was retrieved inspecting the length of the ureter 1 last time which was free of fragment.   Finally, a 4.5 x 24 stent was placed over the wire.  The wire was backloaded through the cystoscope and under direct vision the stent was placed.  Upon retrieval of the wire there was proximal coil within the UPJ and visible distal coil within the bladder.  Patient's bladder was then emptied, stone fragments passed off for chemical analysis, and the procedure deemed terminated.  She was awoken from general anesthesia and transferred to the PACU in stable condition.         Signed:  Corie Edgar MD  08/08/22  10:35 EDT

## 2022-08-13 LAB
CALCIUM OXALATE DIHYDRATE MFR STONE IR: 20 %
COLOR STONE: NORMAL
COM MFR STONE: 80 %
COMPN STONE: NORMAL
LABORATORY COMMENT REPORT: NORMAL
Lab: NORMAL
Lab: NORMAL
PHOTO: NORMAL
SIZE STONE: NORMAL MM
SPEC SOURCE SUBJ: NORMAL
WT STONE: 57 MG

## 2022-08-16 ENCOUNTER — PROCEDURE VISIT (OUTPATIENT)
Dept: UROLOGY | Facility: CLINIC | Age: 57
End: 2022-08-16

## 2022-08-16 VITALS — WEIGHT: 214 LBS | BODY MASS INDEX: 33.59 KG/M2 | RESPIRATION RATE: 16 BRPM | HEIGHT: 67 IN

## 2022-08-16 DIAGNOSIS — N13.30 HYDRONEPHROSIS, UNSPECIFIED HYDRONEPHROSIS TYPE: ICD-10-CM

## 2022-08-16 DIAGNOSIS — R30.0 DYSURIA: Primary | ICD-10-CM

## 2022-08-16 LAB
BILIRUB BLD-MCNC: NEGATIVE MG/DL
CLARITY, POC: ABNORMAL
COLOR UR: ABNORMAL
EXPIRATION DATE: ABNORMAL
GLUCOSE UR STRIP-MCNC: NEGATIVE MG/DL
KETONES UR QL: NEGATIVE
LEUKOCYTE EST, POC: ABNORMAL
Lab: ABNORMAL
NITRITE UR-MCNC: NEGATIVE MG/ML
PH UR: 5.5 [PH] (ref 5–8)
PROT UR STRIP-MCNC: ABNORMAL MG/DL
RBC # UR STRIP: ABNORMAL /UL
SP GR UR: 1.02 (ref 1–1.03)
UROBILINOGEN UR QL: NORMAL

## 2022-08-16 PROCEDURE — 81003 URINALYSIS AUTO W/O SCOPE: CPT | Performed by: UROLOGY

## 2022-08-16 PROCEDURE — 52310 CYSTOSCOPY AND TREATMENT: CPT | Performed by: UROLOGY

## 2022-08-16 NOTE — PROGRESS NOTES
Preoperative diagnosis  Foreign body in genitourinary tract; hydronephrosis    Postoperative diagnosis  Same as above    Procedure  Flexible cystourethroscopy with stent removal    Attending surgeon  Corie Edgar MD     Anesthesia  2% lidocaine jelly intraurethrally    Complications  None    Specimen  None    Estimated blood loss  0cc    Indications  57 y.o. female who is status post right  ureteroscopy with stone treatment who presents for stent removal.    Procedure  The patient was appropriately identified and brought to the cytoscopy suite. A timeout was undertaken documenting the correct patient, site, and procedure. The patient was prepped in a normal sterile fashion. A flexible cytoscope was then introduced into the bladder. The stent was identified and grasped with endoscopic graspers. The entire stent was removed and passed off the field. Patient tolerated the procedure well. There were no intraprocedural complications.     Plan  Tolerated procedure well.  Instructions provided.  Patient follow-up in 4 to 6 weeks with renal ultrasound prior or earlier as needed  All question addressed

## 2022-08-19 ENCOUNTER — HOSPITAL ENCOUNTER (OUTPATIENT)
Dept: ULTRASOUND IMAGING | Facility: HOSPITAL | Age: 57
Discharge: HOME | End: 2022-08-19
Admitting: UROLOGY

## 2022-08-19 DIAGNOSIS — N13.30 HYDRONEPHROSIS, UNSPECIFIED HYDRONEPHROSIS TYPE: ICD-10-CM

## 2022-08-19 PROCEDURE — 76775 US EXAM ABDO BACK WALL LIM: CPT

## 2022-09-27 PROBLEM — M54.9 BACK PAIN: Status: ACTIVE | Noted: 2020-08-20

## 2022-09-29 ENCOUNTER — TELEPHONE (OUTPATIENT)
Dept: UROLOGY | Facility: CLINIC | Age: 57
End: 2022-09-29

## 2022-12-07 ENCOUNTER — TELEPHONE (OUTPATIENT)
Dept: UROLOGY | Facility: CLINIC | Age: 57
End: 2022-12-07

## 2022-12-07 NOTE — TELEPHONE ENCOUNTER
"SPOKE TO PT TO TRY TO R/S CX APPT WITH DR. HUDSON FROM 12/6 AND PT STATED \"SHE WAS DOING FINE AND DIDN'T NEED TO R/S THAT IT WAS JUST A F/U\" ANYTHING ELSE TO DO?  "

## 2023-01-17 ENCOUNTER — OFFICE VISIT (OUTPATIENT)
Dept: SURGERY | Facility: CLINIC | Age: 58
End: 2023-01-17
Payer: COMMERCIAL

## 2023-01-17 VITALS — BODY MASS INDEX: 33.15 KG/M2 | HEIGHT: 67 IN | WEIGHT: 211.2 LBS

## 2023-01-17 DIAGNOSIS — R19.00 ABDOMINAL WALL BULGE: Primary | ICD-10-CM

## 2023-01-17 DIAGNOSIS — K43.2 INCISIONAL HERNIA, WITHOUT OBSTRUCTION OR GANGRENE: ICD-10-CM

## 2023-01-17 PROCEDURE — 99213 OFFICE O/P EST LOW 20 MIN: CPT | Performed by: SURGERY

## 2023-01-17 RX ORDER — DOCUSATE SODIUM 100 MG/1
100 CAPSULE, LIQUID FILLED ORAL 2 TIMES DAILY
COMMUNITY
Start: 2022-12-05

## 2023-01-17 RX ORDER — HYDROCODONE BITARTRATE AND ACETAMINOPHEN 5; 325 MG/1; MG/1
1 TABLET ORAL 2 TIMES DAILY PRN
COMMUNITY
Start: 2023-01-10

## 2023-01-30 NOTE — PROGRESS NOTES
General Surgery  Established Patient Office Visit    CC: Follow-up of abdominal wall bulge    HPI: The patient is a pleasant 57 y.o. year-old lady who presents today for evaluation of a persistent intermittent bulge of the abdominal wall within the right upper quadrant where I previously performed an open incisional hernia repair with excision of preperitoneal mesh in October 2021.  I saw her in April of last year for similar symptoms and requested she send me a disc with a copy of an outpatient CT scan that had been performed at [facility], but she never sent the images over.  She says now the right upper quadrant abdominal bulge is associated with pretty significant sporadic cramping abdominal pain that now doubles her over due to its severity.  The frequency of the painful episodes is also increasing and seems to happen 2-3 times a day with pain radiation into the left upper quadrant and her back.  On exam, there is a palpable bulge within the right upper quadrant abdomen that seems to be reducible but is somewhat painful during palpation.  She has had no change in her bowel habits and has had no nausea or vomiting.  Her history is complicated with a previous ventral hernia repair utilizing mesh performed in [place], [place] before she and I ever met.  When I excised the mesh in October 2021, it was a circular Ventralex mesh and I repaired her abdominal wall muscles primarily in 2 layers.    Past Medical History:   Hypothyroidism  COPD  Asthma  History of nephrolithiasis  Back problems  Frequent bladder infections  History of bronchitis  History of seizures  History of stroke     Past Surgical History:   Laparoscopic cholecystectomy  Hysterectomy  Lithotripsy  Tubal ligation  Cystoscopy with bladder biopsy  Incision and drainage of left breast abscess  Left breast excisional biopsy x2  Ventral hernia repair with mesh (?2016 in [place], [place])  Colonoscopy (2015 in [place], [place]-no problems  reported)  Abdominal wall mesh excision with primary ventral hernia repair (October 2021 by me)    Medications:   Albuterol inhaler as needed  Anoro Ellipta inhaler 1 puff daily  Baclofen 10 mg 3 times daily as needed for muscle spasms  Cholecalciferol 5000 units daily  Colace 100 mg as needed  Trelegy Ellipta inhaler 1 puff daily  Gabapentin 100 g twice daily  Norco 5/325 mg daily as needed for pain  Levothyroxine 112 mcg daily  Magnesium oxide 400 mg daily as needed  Dulera inhaler daily as needed  Paroxetine 40 mg daily  Potassium chloride 10 mill equivalents daily    Allergies: Bupropion (vomiting/delirium)    Family History: Father with history of colon/lung cancer, brother with kidney cancer     Social History: , smokes 1 pack/day cigarettes for the last 25 years, no regular alcohol use, no illicit drug use    ROS:   Constitutional: Negative for fevers or chills  HENT: Negative for hearing loss or runny nose  Eyes: Negative for vision changes or scleral icterus  Respiratory: Negative for cough or shortness of breath  Cardiovascular: Negative for chest pain or heart palpitations  Gastrointestinal: Positive for abdominal pain; negative for abdominal distension, nausea, vomiting, constipation, melena, or hematochezia  Genitourinary: Negative for hematuria or dysuria  Musculoskeletal: Negative for joint swelling or gait instability  Neurologic: Negative for tremors or seizures  Psychiatric: Negative for suicidal ideations or agitation  All other systems reviewed and negative    Physical Exam:  Height: 170 cm  Weight: 95 kg  BMI: 33  General: No acute distress, well-nourished & well-developed  HEAD: normocephalic, atraumatic  EYES: normal conjunctiva, sclera anicteric  EARS: grossly normal hearing  NECK: supple, no thyromegaly  CARDIOVASCULAR: regular rate and rhythm  RESPIRATORY: clear to auscultation bilaterally  GASTROINTESTINAL: soft, nontender, non-distended, well-healed surgical scar on the right upper  quadrant with a palpable reducible bulge just above the scar when she is standing upright and coughing  MUSCULOSKELETAL: normal gait and station. No gross extremity abnormalities  PSYCHIATRIC: oriented x3, normal mood and affect    IMAGING:  CT [facility]BD[facility]LVIS [facility]  FINDINGS:   The visualized lung bases are unremarkable.  The visualized portions of the heart are within normal limits.   There is mild fatty infiltration of the liver, no discrete lesion.  There are surgical clips in the gallbladder fossa consistent with a prior cholecystectomy.  Normal spleen.  Normal pancreas.   Normal bilateral adrenal glands.   No obstructive uropathy or suspicious solid renal lesion, there are punctate nonobstructing renal stones.   Normal visualized stomach.  Normal small intestine.  Normal colon.  The appendix is visualized and appears normal.  Appendix seen on coronal recon images 29 through 35   There is diffuse atherosclerotic calcification of the abdominal aorta, without a demonstrated aneurysm.  Normal inferior vena cava.  Normal retroperitoneum.   Normal urinary bladder.   Normal abdominal wall.  There are diffuse degenerative changes of the visualized lumbar spine, and pelvis.   IMPRESSION:  Mild fatty infiltration of the liver, no discrete lesion   Punctate nonobstructing bilateral renal stones   No free intraperitoneal fluid, air, or suspicious adenopathy.  Normal appendix visualized     CT ABDOMEN/PELVIS (7/22/2022, [facility]):  7 mm obstructing stone in the distal right ureter with associated moderate right hydroureteronephrosis.  Other nonobstructing stones are seen bilaterally.  Bones/soft tissues show mild scattered degenerative changes of the visualized spine.    ASSESSMENT & PLAN  Mrs. Silva is a 57-year-old lady with a subtle bulge of the abdominal wall just above previous ventral hernia repair scar.  She has had multiple outpatient CT scans at other facilities which I do not have access  to the images for personal review.  I have reviewed the reports from both CTs done at [facility] in 2022 and neither of them mention an abdominal wall hernia.  I would recommend repeating a CT scan within the [facility] system so that I can personally review it and we can discuss surgical plans thereafter.    Millie Hummel MD  General, Robotic, and Endoscopic Surgery  [facility]    [address], [address]  [place] [address]  P: [phone]  F: [phone]

## 2023-03-10 ENCOUNTER — HOSPITAL ENCOUNTER (OUTPATIENT)
Dept: CT IMAGING | Facility: HOSPITAL | Age: 58
Discharge: HOME | End: 2023-03-10
Admitting: SURGERY
Payer: COMMERCIAL

## 2023-03-10 DIAGNOSIS — K43.2 INCISIONAL HERNIA, WITHOUT OBSTRUCTION OR GANGRENE: ICD-10-CM

## 2023-03-10 DIAGNOSIS — R19.00 ABDOMINAL WALL BULGE: ICD-10-CM

## 2023-03-10 LAB
CREAT BLDA-MCNC: 0.7 MG/DL
EGFRCR SERPLBLD CKD-EPI 2021: 99.2 ML/MIN/1.73

## 2023-03-10 PROCEDURE — 25510000001 IOPAMIDOL PER 1 ML: Performed by: SURGERY

## 2023-03-10 PROCEDURE — 82565 ASSAY OF CREATININE: CPT

## 2023-03-10 PROCEDURE — 74177 CT ABD & PELVIS W/CONTRAST: CPT

## 2023-03-10 RX ADMIN — IOPAMIDOL 100 ML: 755 INJECTION, SOLUTION INTRAVENOUS at 15:35

## 2023-04-04 ENCOUNTER — TELEPHONE (OUTPATIENT)
Dept: SURGERY | Facility: CLINIC | Age: 58
End: 2023-04-04
Payer: COMMERCIAL

## 2023-04-04 NOTE — TELEPHONE ENCOUNTER
Left voice mail for patient to call back and scheduled appointment with Dr. Hummel, first available is okay.

## 2023-04-04 NOTE — TELEPHONE ENCOUNTER
----- Message from Millie Hummel MD sent at 4/3/2023 10:36 AM EDT -----  Can someone call the patient and let her know I reviewed her CT scan?  She has a recurrent incisional hernia of her upper abdomen at the site where I previously removed all the mesh.  It contains a short section of her colon but there is no complicating features, specifically no evidence for obstruction of the colon.  I would recommend she come to see me in the office so we can talk about repairing this surgically and discuss those options.    Thanks,  YUMIKO

## (undated) DEVICE — GLV SURG SENSICARE POLYISPRN W/ALOE PF LF 6.5 GRN STRL

## (undated) DEVICE — CATH URETRL FLXITP POLLACK STD 5F 70CM

## (undated) DEVICE — Device

## (undated) DEVICE — Y-TYPE TUR/BLADDER IRRIGATION SET, REGULATING CLAMP

## (undated) DEVICE — ANTIBACTERIAL UNDYED BRAIDED (POLYGLACTIN 910), SYNTHETIC ABSORBABLE SUTURE: Brand: COATED VICRYL

## (undated) DEVICE — BASIC SINGLE BASIN-LF: Brand: MEDLINE INDUSTRIES, INC.

## (undated) DEVICE — SYS IRR PUMP SGL ACTN VAC SYR 10CC

## (undated) DEVICE — GLV SURG BIOGEL LTX PF 6

## (undated) DEVICE — NITINOL WIRE WITH HYDROPHILIC TIP: Brand: SENSOR

## (undated) DEVICE — PATIENT RETURN ELECTRODE, SINGLE-USE, CONTACT QUALITY MONITORING, ADULT, WITH 9FT CORD, FOR PATIENTS WEIGING OVER 33LBS. (15KG): Brand: MEGADYNE

## (undated) DEVICE — PK PROC MINOR TOWER 40

## (undated) DEVICE — INTENDED FOR TISSUE SEPARATION, AND OTHER PROCEDURES THAT REQUIRE A SHARP SURGICAL BLADE TO PUNCTURE OR CUT.: Brand: BARD-PARKER ® CARBON RIB-BACK BLADES

## (undated) DEVICE — SKIN PREP TRAY W/CHG: Brand: MEDLINE INDUSTRIES, INC.

## (undated) DEVICE — GLV SURG SENSICARE SLT PF LF 6.5 STRL

## (undated) DEVICE — CYSTO PACK: Brand: MEDLINE INDUSTRIES, INC.

## (undated) DEVICE — GLV SURG SENSICARE SLT PF LF 7 STRL

## (undated) DEVICE — SUT PDS 2/0 SH 27IN Z317H

## (undated) DEVICE — SHEATH ACC URETRL UROPASS 12/14F 24CM EA/5

## (undated) DEVICE — SOL IRR NACL 0.9PCT 3000ML

## (undated) DEVICE — GW AMPLTZ SUPERSTIFF STR .035IN 180CM

## (undated) DEVICE — SUT ETHIB 0/0 MO6 I8IN CX45D

## (undated) DEVICE — 3M™ STERI-STRIP™ REINFORCED ADHESIVE SKIN CLOSURES, R1547, 1/2 IN X 4 IN (12 MM X 100 MM), 6 STRIPS/ENVELOPE: Brand: 3M™ STERI-STRIP™

## (undated) DEVICE — APPL CHLORAPREP HI/LITE 26ML ORNG

## (undated) DEVICE — SUT VIC 3/0 TIES 18IN J110T

## (undated) DEVICE — GW ULTRATRACK HYBRID STR/TP .035IN 3X150CM EA/5

## (undated) DEVICE — SPNG GZ WOVN 4X4IN 12PLY 10/BX STRL

## (undated) DEVICE — NITINOL STONE RETRIEVAL BASKET: Brand: ESCAPE

## (undated) DEVICE — PENCL E/S ULTRAVAC TELESCP NOSE HOLSTR 10FT

## (undated) DEVICE — FIBR LASR HOLMIUM COMPAT 272MH DISP

## (undated) DEVICE — ELECTRD BLD EZ CLN MOD XLNG 2.75IN

## (undated) DEVICE — TOWEL,OR,DSP,ST,BLUE,STD,4/PK,20PK/CS: Brand: MEDLINE

## (undated) DEVICE — TRAP FLD MINIVAC MEGADYNE 100ML

## (undated) DEVICE — IRRIGATOR BULB ASEPTO 60CC STRL

## (undated) DEVICE — GOWN,REINFORCE,POLY,SIRUS,BREATH SLV,XLG: Brand: MEDLINE